# Patient Record
Sex: FEMALE | Race: WHITE | NOT HISPANIC OR LATINO | Employment: FULL TIME | URBAN - METROPOLITAN AREA
[De-identification: names, ages, dates, MRNs, and addresses within clinical notes are randomized per-mention and may not be internally consistent; named-entity substitution may affect disease eponyms.]

---

## 2023-11-30 DIAGNOSIS — I10 HYPERTENSION, UNSPECIFIED TYPE: ICD-10-CM

## 2023-11-30 RX ORDER — NIFEDIPINE 60 MG/1
60 TABLET, EXTENDED RELEASE ORAL DAILY
Qty: 90 TABLET | Refills: 3 | Status: SHIPPED
Start: 2023-11-30 | End: 2024-03-15 | Stop reason: WASHOUT

## 2023-12-14 PROBLEM — I10 HYPERTENSION: Status: ACTIVE | Noted: 2022-12-09

## 2023-12-14 PROBLEM — F41.9 ANXIETY: Status: ACTIVE | Noted: 2023-12-14

## 2023-12-14 PROBLEM — R87.619 ABNORMAL PAP SMEAR OF CERVIX: Status: ACTIVE | Noted: 2023-12-14

## 2023-12-14 PROBLEM — R74.8 ABNORMAL LIVER ENZYMES: Status: ACTIVE | Noted: 2023-12-14

## 2023-12-14 PROBLEM — R00.2 PALPITATIONS: Status: ACTIVE | Noted: 2023-12-14

## 2023-12-14 PROBLEM — R79.89 TSH ELEVATION: Status: ACTIVE | Noted: 2023-12-14

## 2023-12-14 PROBLEM — R87.619 ABNORMAL PAP SMEAR OF CERVIX: Status: RESOLVED | Noted: 2023-12-14 | Resolved: 2023-12-14

## 2024-02-19 PROCEDURE — RXMED WILLOW AMBULATORY MEDICATION CHARGE

## 2024-02-20 ENCOUNTER — PHARMACY VISIT (OUTPATIENT)
Dept: PHARMACY | Facility: CLINIC | Age: 34
End: 2024-02-20
Payer: COMMERCIAL

## 2024-03-15 ENCOUNTER — OFFICE VISIT (OUTPATIENT)
Dept: PRIMARY CARE | Facility: CLINIC | Age: 34
End: 2024-03-15
Payer: COMMERCIAL

## 2024-03-15 VITALS
TEMPERATURE: 97.7 F | WEIGHT: 135.4 LBS | SYSTOLIC BLOOD PRESSURE: 145 MMHG | BODY MASS INDEX: 23.99 KG/M2 | DIASTOLIC BLOOD PRESSURE: 98 MMHG | HEIGHT: 63 IN | HEART RATE: 93 BPM

## 2024-03-15 DIAGNOSIS — E78.5 HYPERLIPIDEMIA, UNSPECIFIED HYPERLIPIDEMIA TYPE: ICD-10-CM

## 2024-03-15 DIAGNOSIS — F41.9 ANXIETY: ICD-10-CM

## 2024-03-15 DIAGNOSIS — I10 PRIMARY HYPERTENSION: ICD-10-CM

## 2024-03-15 DIAGNOSIS — B34.9 VIRAL SYNDROME: ICD-10-CM

## 2024-03-15 DIAGNOSIS — Z00.00 WELL ADULT HEALTH CHECK: ICD-10-CM

## 2024-03-15 PROCEDURE — 3077F SYST BP >= 140 MM HG: CPT | Performed by: INTERNAL MEDICINE

## 2024-03-15 PROCEDURE — 99214 OFFICE O/P EST MOD 30 MIN: CPT | Performed by: INTERNAL MEDICINE

## 2024-03-15 PROCEDURE — 3080F DIAST BP >= 90 MM HG: CPT | Performed by: INTERNAL MEDICINE

## 2024-03-15 PROCEDURE — 1036F TOBACCO NON-USER: CPT | Performed by: INTERNAL MEDICINE

## 2024-03-15 PROCEDURE — RXMED WILLOW AMBULATORY MEDICATION CHARGE

## 2024-03-15 RX ORDER — DULOXETIN HYDROCHLORIDE 20 MG/1
20 CAPSULE, DELAYED RELEASE ORAL DAILY
COMMUNITY
End: 2024-03-15 | Stop reason: SDUPTHER

## 2024-03-15 RX ORDER — DULOXETIN HYDROCHLORIDE 20 MG/1
20 CAPSULE, DELAYED RELEASE ORAL DAILY
Qty: 90 CAPSULE | Refills: 1 | Status: SHIPPED | OUTPATIENT
Start: 2024-03-15

## 2024-03-15 ASSESSMENT — PATIENT HEALTH QUESTIONNAIRE - PHQ9
SUM OF ALL RESPONSES TO PHQ9 QUESTIONS 1 AND 2: 0
2. FEELING DOWN, DEPRESSED OR HOPELESS: NOT AT ALL
1. LITTLE INTEREST OR PLEASURE IN DOING THINGS: NOT AT ALL

## 2024-03-15 NOTE — PROGRESS NOTES
Assessment/Plan   Problem List Items Addressed This Visit       Anxiety    Relevant Medications    DULoxetine (Cymbalta) 20 mg DR capsule    Other Relevant Orders    TSH with reflex to Free T4 if abnormal    Hypertension    Relevant Orders    Comprehensive Metabolic Panel    Lipid Panel    Postpartum depression - Primary    Relevant Medications    DULoxetine (Cymbalta) 20 mg DR capsule    Other Relevant Orders    TSH with reflex to Free T4 if abnormal    Viral syndrome    Relevant Orders    CBC and Auto Differential     Other Visit Diagnoses       Well adult health check        Relevant Orders    Comprehensive Metabolic Panel    Hyperlipidemia, unspecified hyperlipidemia type        Relevant Orders    Lipid Panel        #1 viral syndrome will resolve  #2 hypertension under control  #3 anxiety and depression under control current medication she wants to continue it refill provided advised to follow-up in 3 months  #4 Labs as ordered to be done    Subjective   Patient ID: Tamera Isaac is a 33 y.o. female who presents for Follow-up (Patient declines a physical today.  ).    Past Surgical History:   Procedure Laterality Date    OTHER SURGICAL HISTORY  2022    Surgically induced       No family history on file.   Social History     Socioeconomic History    Marital status:      Spouse name: Not on file    Number of children: Not on file    Years of education: Not on file    Highest education level: Not on file   Occupational History    Not on file   Tobacco Use    Smoking status: Never    Smokeless tobacco: Never   Substance and Sexual Activity    Alcohol use: Yes     Comment: 1 per month    Drug use: Never    Sexual activity: Not on file   Other Topics Concern    Not on file   Social History Narrative    Not on file     Social Determinants of Health     Financial Resource Strain: Not on file   Food Insecurity: Not on file   Transportation Needs: Not on file   Physical Activity: Not on file  "  Stress: Not on file   Social Connections: Not on file   Intimate Partner Violence: Not on file   Housing Stability: Not on file      Patient has no known allergies.   Current Outpatient Medications   Medication Sig Dispense Refill    DOCOSAHEXAENOIC ACID ORAL Take 1 capsule by mouth once daily.      NIFEdipine ER (NIFEdipine XL) 60 mg 24 hr tablet Take 1 tablet by mouth every day as directed 330 tablet 0    DULoxetine (Cymbalta) 20 mg DR capsule Take 1 capsule (20 mg) by mouth once daily. 90 capsule 1     No current facility-administered medications for this visit.      Vitals:    03/15/24 1049   BP: (!) 145/98   BP Location: Right arm   Patient Position: Sitting   Pulse: 93   Temp: 36.5 °C (97.7 °F)   Weight: 61.4 kg (135 lb 6.4 oz)   Height: 1.6 m (5' 3\")      Problem List Items Addressed This Visit       Anxiety    Relevant Medications    DULoxetine (Cymbalta) 20 mg DR capsule    Other Relevant Orders    TSH with reflex to Free T4 if abnormal    Hypertension    Relevant Orders    Comprehensive Metabolic Panel    Lipid Panel    Postpartum depression - Primary    Relevant Medications    DULoxetine (Cymbalta) 20 mg DR capsule    Other Relevant Orders    TSH with reflex to Free T4 if abnormal    Viral syndrome    Relevant Orders    CBC and Auto Differential     Other Visit Diagnoses       Well adult health check        Relevant Orders    Comprehensive Metabolic Panel    Hyperlipidemia, unspecified hyperlipidemia type        Relevant Orders    Lipid Panel           Orders Placed This Encounter   Procedures    CBC and Auto Differential     Standing Status:   Future     Standing Expiration Date:   3/15/2025     Order Specific Question:   Release result to [employer]     Answer:   Immediate    Comprehensive Metabolic Panel     Standing Status:   Future     Standing Expiration Date:   3/15/2025     Order Specific Question:   Release result to [employer]     Answer:   Immediate    Lipid Panel     Standing Status:   Future     " "Standing Expiration Date:   3/15/2025     Order Specific Question:   Release result to [employer]     Answer:   Immediate    TSH with reflex to Free T4 if abnormal     Standing Status:   Future     Standing Expiration Date:   3/15/2025     Order Specific Question:   Release result to MyChart     Answer:   Immediate        HPI  Came for review and refill  Has been doing well  Except for last 2 3 days has a sneezing and congestion took COVID test that was negative does not want any antibiotic rightly so because it is a viral syndrome  Advised had to be seen more frequently  Her baby is doing very well 1-year-old    ROS negative  Past medical history reviewed  Social and family history reviewed  Allergies and medications reviewed  Recent labs reviewed  Vital signs reviewed    PHYSICAL EXAM  Cardiovascular chest abdominal neurological exam normal  Affect normal      No results found for: \"PR1\", \"BMPR1A\", \"CMPLAS\", \"KK3MYRUE\", \"KPSAT\"   No results found for: \"CHOL\", \"LDLCALC\", \"HDLC\", \"LCTRG\", \"CHHDL\"             "

## 2024-03-22 ENCOUNTER — OFFICE VISIT (OUTPATIENT)
Dept: NEPHROLOGY | Facility: CLINIC | Age: 34
End: 2024-03-22
Payer: COMMERCIAL

## 2024-03-22 ENCOUNTER — LAB (OUTPATIENT)
Dept: LAB | Facility: LAB | Age: 34
End: 2024-03-22
Payer: COMMERCIAL

## 2024-03-22 VITALS
SYSTOLIC BLOOD PRESSURE: 148 MMHG | BODY MASS INDEX: 23.74 KG/M2 | DIASTOLIC BLOOD PRESSURE: 95 MMHG | HEIGHT: 63 IN | WEIGHT: 134 LBS | HEART RATE: 92 BPM

## 2024-03-22 DIAGNOSIS — I10 ESSENTIAL HYPERTENSION: ICD-10-CM

## 2024-03-22 DIAGNOSIS — I10 PRIMARY HYPERTENSION: ICD-10-CM

## 2024-03-22 DIAGNOSIS — E78.5 HYPERLIPIDEMIA, UNSPECIFIED HYPERLIPIDEMIA TYPE: ICD-10-CM

## 2024-03-22 DIAGNOSIS — B34.9 VIRAL SYNDROME: ICD-10-CM

## 2024-03-22 DIAGNOSIS — F41.9 ANXIETY: ICD-10-CM

## 2024-03-22 DIAGNOSIS — I10 ESSENTIAL HYPERTENSION: Primary | ICD-10-CM

## 2024-03-22 DIAGNOSIS — Z00.00 WELL ADULT HEALTH CHECK: ICD-10-CM

## 2024-03-22 LAB
ALBUMIN SERPL BCP-MCNC: 4.5 G/DL (ref 3.4–5)
ALP SERPL-CCNC: 70 U/L (ref 33–110)
ALT SERPL W P-5'-P-CCNC: 15 U/L (ref 7–45)
ANION GAP SERPL CALC-SCNC: 12 MMOL/L (ref 10–20)
AST SERPL W P-5'-P-CCNC: 17 U/L (ref 9–39)
BASOPHILS # BLD AUTO: 0.04 X10*3/UL (ref 0–0.1)
BASOPHILS NFR BLD AUTO: 0.4 %
BILIRUB SERPL-MCNC: 0.5 MG/DL (ref 0–1.2)
BUN SERPL-MCNC: 13 MG/DL (ref 6–23)
CALCIUM SERPL-MCNC: 8.9 MG/DL (ref 8.6–10.3)
CHLORIDE SERPL-SCNC: 100 MMOL/L (ref 98–107)
CHOLEST SERPL-MCNC: 174 MG/DL (ref 0–199)
CHOLESTEROL/HDL RATIO: 1.7
CO2 SERPL-SCNC: 28 MMOL/L (ref 21–32)
CREAT SERPL-MCNC: 0.59 MG/DL (ref 0.5–1.05)
EGFRCR SERPLBLD CKD-EPI 2021: >90 ML/MIN/1.73M*2
EOSINOPHIL # BLD AUTO: 0.05 X10*3/UL (ref 0–0.7)
EOSINOPHIL NFR BLD AUTO: 0.6 %
ERYTHROCYTE [DISTWIDTH] IN BLOOD BY AUTOMATED COUNT: 12.4 % (ref 11.5–14.5)
GLUCOSE SERPL-MCNC: 82 MG/DL (ref 74–99)
HCT VFR BLD AUTO: 40.4 % (ref 36–46)
HDLC SERPL-MCNC: 102.3 MG/DL
HGB BLD-MCNC: 13.4 G/DL (ref 12–16)
IMM GRANULOCYTES # BLD AUTO: 0.03 X10*3/UL (ref 0–0.7)
IMM GRANULOCYTES NFR BLD AUTO: 0.3 % (ref 0–0.9)
LDLC SERPL CALC-MCNC: 60 MG/DL
LYMPHOCYTES # BLD AUTO: 2.17 X10*3/UL (ref 1.2–4.8)
LYMPHOCYTES NFR BLD AUTO: 24.2 %
MCH RBC QN AUTO: 31.7 PG (ref 26–34)
MCHC RBC AUTO-ENTMCNC: 33.2 G/DL (ref 32–36)
MCV RBC AUTO: 96 FL (ref 80–100)
MONOCYTES # BLD AUTO: 0.68 X10*3/UL (ref 0.1–1)
MONOCYTES NFR BLD AUTO: 7.6 %
NEUTROPHILS # BLD AUTO: 6.01 X10*3/UL (ref 1.2–7.7)
NEUTROPHILS NFR BLD AUTO: 66.9 %
NON HDL CHOLESTEROL: 72 MG/DL (ref 0–149)
NRBC BLD-RTO: 0 /100 WBCS (ref 0–0)
PLATELET # BLD AUTO: 279 X10*3/UL (ref 150–450)
POTASSIUM SERPL-SCNC: 4.4 MMOL/L (ref 3.5–5.3)
PROT SERPL-MCNC: 7.4 G/DL (ref 6.4–8.2)
RBC # BLD AUTO: 4.23 X10*6/UL (ref 4–5.2)
SODIUM SERPL-SCNC: 136 MMOL/L (ref 136–145)
TRIGL SERPL-MCNC: 60 MG/DL (ref 0–149)
TSH SERPL-ACNC: 1.94 MIU/L (ref 0.44–3.98)
VLDL: 12 MG/DL (ref 0–40)
WBC # BLD AUTO: 9 X10*3/UL (ref 4.4–11.3)

## 2024-03-22 PROCEDURE — 99213 OFFICE O/P EST LOW 20 MIN: CPT | Performed by: INTERNAL MEDICINE

## 2024-03-22 PROCEDURE — 85025 COMPLETE CBC W/AUTO DIFF WBC: CPT

## 2024-03-22 PROCEDURE — 84443 ASSAY THYROID STIM HORMONE: CPT

## 2024-03-22 PROCEDURE — 3077F SYST BP >= 140 MM HG: CPT | Performed by: INTERNAL MEDICINE

## 2024-03-22 PROCEDURE — 36415 COLL VENOUS BLD VENIPUNCTURE: CPT

## 2024-03-22 PROCEDURE — 1036F TOBACCO NON-USER: CPT | Performed by: INTERNAL MEDICINE

## 2024-03-22 PROCEDURE — 80053 COMPREHEN METABOLIC PANEL: CPT

## 2024-03-22 PROCEDURE — 3080F DIAST BP >= 90 MM HG: CPT | Performed by: INTERNAL MEDICINE

## 2024-03-22 PROCEDURE — 80061 LIPID PANEL: CPT

## 2024-03-22 NOTE — PROGRESS NOTES
Tamera Isaac   33 y.o.    @WT@  N/Room: [id number]/Room/bed info not found    Subjective:   The patient is being seen for a routine clinic follow-up of hypertension.      Meds:   Current Outpatient Medications   Medication Sig Dispense Refill    DOCOSAHEXAENOIC ACID ORAL Take 1 capsule by mouth once daily.      DULoxetine (Cymbalta) 20 mg DR capsule Take 1 capsule (20 mg) by mouth once daily. 90 capsule 1    NIFEdipine ER (NIFEdipine XL) 60 mg 24 hr tablet Take 1 tablet by mouth every day as directed 330 tablet 0     No current facility-administered medications for this visit.          ROS:  The patient is awake and oriented. No dizziness or lightheadedness. No chills and no fever. No headaches. No nausea and no vomiting. No shortness of breath. No cough. No sputum. No chest pain. No chest tightness. No abdominal pain. No diarrhea and no constipation. No hematemesis or hemoptysis. No hematuria. No rectal bleeding. No melena. No epistaxis. No urinary symptoms. No flank pain. No leg edema. No leg pain. No weakness. No itching. Overall, the rest of the review of systems is also negative.  12 point review of systems otherwise negative as stated in HPI.        Physical Examination:        Vitals:    03/22/24 1010   BP: (!) 148/95   Pulse: 92     General: The patient is awake, oriented, and is not in any distress.  Head and Neck: Normocephalic. No periorbital edema.  Eyes: non-icteric  Respiratory: Symmetric air entry. Symmetric chest expansion.No respiratory distress.  Cardiovascular: Symmetric peripheral pulses.  Skin: No maculopapular rash.  Abdomen: soft, nt/nd  Musculoskeletal: No peripheral edema in both left and right upper extremities.  No edema in either left or right lower extremities.  Neuro Exam: Speech is fluent. Moves extremities.    Imaging:  === 09/20/19 ===    US RENAL COMPLETE    - Impression -  No nephrolithiasis or hydronephrosis.       Blood Labs:  No results found for this or any previous visit  (from the past 24 hour(s)).   Lab Results   Component Value Date    PROTUR <4 (L) 09/26/2019    PROTUR NEGATIVE 09/26/2019      Lab Results   Component Value Date    GLUCOSE 93 01/03/2020    CALCIUM 9.6 01/03/2020     01/03/2020    K 3.9 01/03/2020    CO2 25 01/03/2020     01/03/2020    BUN 10 01/03/2020    CREATININE 0.65 01/03/2020         Assessment and Plan:    1. Hypertension.  Pressure is high in the office.  I reviewed her home blood pressure readings and she has perfectly good blood pressure control at home.  No change in medication.  We discussed about low-salt diet.    I will see her in about a year for follow-up.         Viktor Vu MD

## 2024-03-27 ENCOUNTER — PHARMACY VISIT (OUTPATIENT)
Dept: PHARMACY | Facility: CLINIC | Age: 34
End: 2024-03-27
Payer: COMMERCIAL

## 2024-04-16 ENCOUNTER — OFFICE VISIT (OUTPATIENT)
Dept: PRIMARY CARE | Facility: CLINIC | Age: 34
End: 2024-04-16
Payer: COMMERCIAL

## 2024-04-16 VITALS
SYSTOLIC BLOOD PRESSURE: 129 MMHG | DIASTOLIC BLOOD PRESSURE: 83 MMHG | HEIGHT: 63 IN | BODY MASS INDEX: 24.34 KG/M2 | WEIGHT: 137.4 LBS | HEART RATE: 96 BPM | TEMPERATURE: 97.7 F

## 2024-04-16 DIAGNOSIS — R22.1 LOCALIZED SWELLING, MASS AND LUMP, NECK: Primary | ICD-10-CM

## 2024-04-16 DIAGNOSIS — I10 HYPERTENSION, UNSPECIFIED TYPE: ICD-10-CM

## 2024-04-16 PROCEDURE — 1036F TOBACCO NON-USER: CPT | Performed by: INTERNAL MEDICINE

## 2024-04-16 PROCEDURE — 99214 OFFICE O/P EST MOD 30 MIN: CPT | Performed by: INTERNAL MEDICINE

## 2024-04-16 PROCEDURE — 3074F SYST BP LT 130 MM HG: CPT | Performed by: INTERNAL MEDICINE

## 2024-04-16 PROCEDURE — 3079F DIAST BP 80-89 MM HG: CPT | Performed by: INTERNAL MEDICINE

## 2024-04-16 ASSESSMENT — PATIENT HEALTH QUESTIONNAIRE - PHQ9
2. FEELING DOWN, DEPRESSED OR HOPELESS: NOT AT ALL
1. LITTLE INTEREST OR PLEASURE IN DOING THINGS: NOT AT ALL
SUM OF ALL RESPONSES TO PHQ9 QUESTIONS 1 AND 2: 0

## 2024-04-16 NOTE — PROGRESS NOTES
"Subjective   Patient ID: Tamera Isaac is a 33 y.o. female who presents for Evaluate lump behind left ear.    HPI Pt is a pleasant 33 y.o. CF who was seen and evaluated during the OV with the hx of the lump behind the left ear for the last 2 months. Pt states that this lump is painless, but getting bigger, Pt denies any hx of trauma.   During the clinical encounter pt denies fever, chills, no SOB, no chest pain/tightness, no abdominal pain, no N/V/D reported, no change of urination reported. Pt denies any other health concerns during this visit.  Sohx: reviewed  PMHx: reviewed  Fhx: father had skin and neck CA    Review of Systems  The systems have been reviewed as follows:   Constitutional: no fever, no chills  Eyes: no eyesight problems, no eye redness, no eye pain, no blurred vision  ENT: no ear pain or sore throat, no nasal discharge or congestion, no sinus pressure, no hearing loss, but as mentioned at HPI  Cardiovascular: no chest pain or tightness, no SOB, the heart rate was not fast or slow  Respiratory: no cough, no dyspnea with exertion or with rest, no wheezing  Gastrointestinal: no abdominal pain, no constipation or diarrhea, no heartburn, no nausea or vomiting  Genitourinary: no urine frequency, no dysuria, no urinary urgency, no urinary incontinence or retention  Musculoskeletal: no back pain, no arthralgia, no joint swelling or stiffness, no muscle weakness  Integumentary: no skin lesions or rash  Neurological: no headaches, no dizziness or fainting, no limb weakness  Psychiatric: no mood changes, no anxiety or depression, no SI/HI  Endocrine: no weight change, no temperature intolerance, no change of appetite  Hematologic/Lymphatic: no easy bruising or bleeding  Objective   /83 (BP Location: Left arm, Patient Position: Sitting)   Pulse 96   Temp 36.5 °C (97.7 °F)   Ht 1.6 m (5' 3\")   Wt 62.3 kg (137 lb 6.4 oz)   BMI 24.34 kg/m²     Physical Exam  Constitutional: well hydrated, well " nourished, no acute distress. Vital signs reviewed  Head and face: normocephalic, atraumatic  Eyes: no erythema, edema or visible abnormalities of conjunctiva or lids. Pupils are equal, round and reactive to light. Extraocular movement normal  ENT: external ears without deformities, external ear canals without redness, swelling or tenderness. TMs normal color, normal landmarks, no fluid, non-retracted  Neck: full ROM, no adenopathy, neck was supple, thyroid gland not enlarged, no palpable nodules. The exam of the left side of the neck showed the bean size soft rubber consistent lump, located on the left superior lateral neck surface  Pulmonary: normal respiratory rate and effort. Lungs are clear to auscultation, no rales,no rhonchi or wheezing  Cardiovascular: RRR, normal S1 and S2, no murmur, no gallop, posterior tib. pulse normal 2+ bilaterally, no lower extremity edema  Abdomen: soft, non tender on palpation, not distended, normal BS in all 4 quadrants  Musculoskeletal: no joint swelling, normal movements of all 4 extremities. Gait and station: normal, Digits and nails: normal without clubbing  Skin: normal color, no rash  Neurologic: Cranial nerves: CN II: visual acuity intact. Source: acuity reported by patient. Pupils reactive to light with normal accommodation. Right and left pupil normal CN III, IV and VI: the EO movements were intact. CN VIII: no hearing loss. Sensory exam: normal light to touch  Psychiatric: Mood and affect: normal, Alert and Oriented x 3  Lymphatic: no cervical lymphadenopathy  Assessment/Plan   Lump on the neck, left sided:  Hx and PE as mentioned  Recent BW reviewed and TSH level WNL  Will order US of the soft neck tissue  Will provide the referral for ENT team evaluation  Pt was instructed to contact PCP if pt will have no improvement of the condition/worsening of the sxs/new sxs  I discussed every sxs with the pt in detail. Pt verbalized understanding of the health  condition    HTN:  continue on nifedipine 60 mg PO daily  Please FU with PCP as planned or sooner if needed.

## 2024-04-20 ENCOUNTER — HOSPITAL ENCOUNTER (OUTPATIENT)
Dept: RADIOLOGY | Facility: CLINIC | Age: 34
Discharge: HOME | End: 2024-04-20
Payer: COMMERCIAL

## 2024-04-20 DIAGNOSIS — R22.1 LOCALIZED SWELLING, MASS AND LUMP, NECK: ICD-10-CM

## 2024-04-20 PROCEDURE — 76536 US EXAM OF HEAD AND NECK: CPT

## 2024-04-20 PROCEDURE — 76536 US EXAM OF HEAD AND NECK: CPT | Performed by: RADIOLOGY

## 2024-04-30 ENCOUNTER — OFFICE VISIT (OUTPATIENT)
Dept: OTOLARYNGOLOGY | Facility: HOSPITAL | Age: 34
End: 2024-04-30
Payer: COMMERCIAL

## 2024-04-30 ENCOUNTER — APPOINTMENT (OUTPATIENT)
Dept: OTOLARYNGOLOGY | Facility: HOSPITAL | Age: 34
End: 2024-04-30
Payer: COMMERCIAL

## 2024-04-30 VITALS — WEIGHT: 134.7 LBS | HEIGHT: 63 IN | BODY MASS INDEX: 23.87 KG/M2 | TEMPERATURE: 96.4 F

## 2024-04-30 DIAGNOSIS — R22.1 LOCALIZED SWELLING, MASS AND LUMP, NECK: ICD-10-CM

## 2024-04-30 DIAGNOSIS — L98.9 SCALP LESION: ICD-10-CM

## 2024-04-30 PROCEDURE — 1036F TOBACCO NON-USER: CPT | Performed by: STUDENT IN AN ORGANIZED HEALTH CARE EDUCATION/TRAINING PROGRAM

## 2024-04-30 PROCEDURE — 99204 OFFICE O/P NEW MOD 45 MIN: CPT | Performed by: STUDENT IN AN ORGANIZED HEALTH CARE EDUCATION/TRAINING PROGRAM

## 2024-04-30 PROCEDURE — 99214 OFFICE O/P EST MOD 30 MIN: CPT | Performed by: STUDENT IN AN ORGANIZED HEALTH CARE EDUCATION/TRAINING PROGRAM

## 2024-04-30 ASSESSMENT — PATIENT HEALTH QUESTIONNAIRE - PHQ9
1. LITTLE INTEREST OR PLEASURE IN DOING THINGS: NOT AT ALL
SUM OF ALL RESPONSES TO PHQ9 QUESTIONS 1 & 2: 0
2. FEELING DOWN, DEPRESSED OR HOPELESS: NOT AT ALL

## 2024-04-30 NOTE — PROGRESS NOTES
"Chief Complaint:    Chief Complaint   Patient presents with    Follow-up     Left side of neck mass eval.       History of Present Illness:  33 y.o. female presents to [facility] on 24 for a left postauricular lesion.  The patient noticed a \"bump\" behind her left ear several months ago.  This seems to be stable and has not changed.  She has no pain or overlying skin changes.  Of note, the patient has a father with a history of what sounds like oropharyngeal cancer and a tongue cancer that was treated at [facility].  Her father is currently doing well, but it sounds like he initially presented with a lymph node, and this placed concern for her.  The patient had an ultrasound of the area showing:    FINDINGS:  Within the left post auricular region at the site of reported lump  there is a small ovoid hypoechoic horizontally oriented lymph node  measuring 6 x 2 x 6 mm. No additional solid or cystic lesion is  demonstrated.      IMPRESSION:  Palpable lump in the left postauricular region corresponds to a  nonspecific subcentimeter lymph node.    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  She is a never smoker and drinks socially.    Past Medical History  Past Medical History:   Diagnosis Date    Abnormal Pap smear of cervix 2023       Past Surgical History  Past Surgical History:   Procedure Laterality Date    OTHER SURGICAL HISTORY  2022    Surgically induced        Social History  Social History     Socioeconomic History    Marital status:      Spouse name: Not on file    Number of children: Not on file    Years of education: Not on file    Highest education level: Not on file   Occupational History    Not on file   Tobacco Use    Smoking status: Never     Passive exposure: Never    Smokeless tobacco: Never   Vaping Use    Vaping status: Never Used   Substance and Sexual Activity    Alcohol use: Yes     Comment: 1 per month    Drug " use: Never    Sexual activity: Not on file   Other Topics Concern    Not on file   Social History Narrative    Not on file     Social Determinants of Health     Financial Resource Strain: Not on file   Food Insecurity: Not on file   Transportation Needs: Not on file   Physical Activity: Not on file   Stress: Not on file   Social Connections: Not on file   Intimate Partner Violence: Not on file   Housing Stability: Not on file       Family History  Family History   Problem Relation Name Age of Onset    Hypertension Mother      Hypertension Father      Hyperlipidemia Father      Skin cancer Father      Other (head cancer) Father      Other (neck cancer) Father      Other (mouth cancer) Father         Medications:  Current Outpatient Medications   Medication Sig Dispense Refill    DULoxetine (Cymbalta) 20 mg DR capsule Take 1 capsule (20 mg) by mouth once daily. 90 capsule 1    NIFEdipine ER (NIFEdipine XL) 60 mg 24 hr tablet Take 1 tablet by mouth every day as directed 330 tablet 0    prenatal no115/iron/folic acid (PRENATAL 19 ORAL) Take 1 tablet by mouth early in the morning..       No current facility-administered medications for this visit.       Allergies: Patient has no known allergies.    Immunizations:   Immunization History   Administered Date(s) Administered    DTP 1990, 1990, 1990    DTaP, Unspecified 09/02/1992    Flu vaccine (IIV4), preservative free *Check age/dose* 10/17/2018, 10/03/2019, 10/06/2020, 11/02/2020, 10/01/2021, 09/30/2022, 11/02/2023    HPV, Quadrivalent 03/26/2015, 05/28/2015, 09/24/2015    Hepatitis A vaccine, age 19 years and greater (HAVRIX) 06/26/2019    Hepatitis B vaccine, pediatric/adolescent (RECOMBIVAX, ENGERIX) 05/27/1994, 06/30/1994, 10/01/1994    HiB, unspecified 1990, 1990, 10/09/1991    Influenza, Unspecified 09/24/2015    Influenza, injectable, quadrivalent 10/01/2017    MMR vaccine, subcutaneous (MMR II) 10/09/1991, 05/23/2002, 07/07/2008     "Meningococcal MCV4P 07/07/2008    Moderna SARS-CoV-2 Vaccination 03/12/2021, 04/09/2021, 10/28/2021    OPV 1990, 1990, 09/02/1992    PPD Test 12/21/2010, 07/10/2015, 09/28/2015, 10/05/2015, 07/07/2017, 11/29/2021, 12/13/2021    Tdap vaccine, age 7 year and older (BOOSTRIX, ADACEL) 07/07/2008, 12/21/2010, 10/10/2022    Varicella vaccine, subcutaneous (VARIVAX) 07/07/2008        Review of Systems:  Constitutional: Negative for fever, weight loss and weight gain  HENT: Negative for ear pain, sore throat and hoarseness.  Negative for difficulty swallowing  Cardiovascular: Negative for chest pain and dyspnea on exertion (Can climb up 2 floors)  Respiratory: Is not experiencing shortness of breath  Gastrointestinal: Negative for nausea and vomiting  Neurological: Negative for headaches.   Psychiatric: The patient is not nervous/anxious   Musculoskeletal: Denies muscle pain/weakness  Heme/Lymph: Negative for lymph nodes, easy bruising    Physical Exam  Vital Signs:  Temp 35.8 °C (96.4 °F)   Ht 1.6 m (5' 3\")   Wt 61.1 kg (134 lb 11.2 oz)   BMI 23.86 kg/m²     General:  Well-developed, well-nourished. No distress  Communication and Voice:  Clear pitch and clarity  Respiratory  Respiratory effort:  Equal inspiration and expiration without stridor  Cardiovascular  Peripheral Vascular:  Warm extremities with equal pulses  Neuro: Patient oriented to person, place, and time;  Appropriate mood and affect;  Gait is intact with no imbalance; Cranial nerves II-XII are intact  Head and Face  Inspection: The patient has 2 superficial ulcers just to the left of her midline scalp.    Palpation:  Facial skeleton intact without bony stepoffs  Facial Strength:  Facial motility symmetric and full bilaterally  Eyes: PERRLA. No nystagmus with normal extraocular motion bilaterally  ENT  Pinna:  External ear intact and fully developed  External canal:  Canal is patent with intact skin  Tympanic Membrane:  Clear and " mobile  External nose:  No scar or anatomic deformity  Internal Nose:  Septum intact and midline.  No edema, polyp, or rhinorrhea.  TMJ:  No pain to palpation with full mobility  Salivary Glands:  No mass or tenderness  Lips: No lesion.  Oral cavity: No mass or lesion.  Oropharynx:  No mass or lesion. Tonsillar fossa symmetric. Base of tongue soft without mass or induration  Neck  Trachea:  Midline trachea.  Thyroid:  No mass or nodularity  Lymphatics:  No lymphadenopathy.  The patient has a less than 1 cm ovoid palpable lesion right overlying the mastoid.  There is no overlying skin changes    Impression/Plan:  33 y.o. female presents to [facility] on 04/30/24 for a left postauricular lesion.  This appears to be benign lymphadenopathy that is likely related to the scalp ulcers that are present on that side.  The patient does report to the picking at the area on her scalp.    -I reassured the patient that she should not be concerned about this area and it is most likely a benign reactive lymph node.  I do think that it may be related to her scalp ulcer and will refer her to dermatology for further workup

## 2024-05-13 PROCEDURE — RXMED WILLOW AMBULATORY MEDICATION CHARGE

## 2024-05-16 ENCOUNTER — PHARMACY VISIT (OUTPATIENT)
Dept: PHARMACY | Facility: CLINIC | Age: 34
End: 2024-05-16
Payer: COMMERCIAL

## 2024-05-24 ENCOUNTER — LAB (OUTPATIENT)
Dept: LAB | Facility: LAB | Age: 34
End: 2024-05-24
Payer: COMMERCIAL

## 2024-05-25 LAB — COTININE UR QL SCN: NEGATIVE

## 2024-06-14 ENCOUNTER — APPOINTMENT (OUTPATIENT)
Dept: OBSTETRICS AND GYNECOLOGY | Facility: CLINIC | Age: 34
End: 2024-06-14
Payer: COMMERCIAL

## 2024-06-14 VITALS
SYSTOLIC BLOOD PRESSURE: 149 MMHG | WEIGHT: 134.8 LBS | BODY MASS INDEX: 23.88 KG/M2 | DIASTOLIC BLOOD PRESSURE: 100 MMHG

## 2024-06-14 DIAGNOSIS — Z34.90 PRENATAL CARE, ANTEPARTUM (HHS-HCC): ICD-10-CM

## 2024-06-14 DIAGNOSIS — O21.9 NAUSEA AND VOMITING IN PREGNANCY PRIOR TO 22 WEEKS GESTATION (HHS-HCC): Primary | ICD-10-CM

## 2024-06-14 PROBLEM — R79.89 TSH ELEVATION: Status: RESOLVED | Noted: 2023-12-14 | Resolved: 2024-06-14

## 2024-06-14 PROBLEM — F41.9 ANXIETY: Status: RESOLVED | Noted: 2023-12-14 | Resolved: 2024-06-14

## 2024-06-14 PROBLEM — R00.2 PALPITATIONS: Status: RESOLVED | Noted: 2023-12-14 | Resolved: 2024-06-14

## 2024-06-14 PROBLEM — Z34.80 SUPERVISION OF OTHER NORMAL PREGNANCY, ANTEPARTUM (HHS-HCC): Status: ACTIVE | Noted: 2024-06-14

## 2024-06-14 PROBLEM — R74.8 ABNORMAL LIVER ENZYMES: Status: RESOLVED | Noted: 2023-12-14 | Resolved: 2024-06-14

## 2024-06-14 PROBLEM — B34.9 VIRAL SYNDROME: Status: RESOLVED | Noted: 2024-03-15 | Resolved: 2024-06-14

## 2024-06-14 PROBLEM — R22.1 LOCALIZED SWELLING, MASS AND LUMP, NECK: Status: RESOLVED | Noted: 2024-04-16 | Resolved: 2024-06-14

## 2024-06-14 PROBLEM — O10.911 PRE-EXISTING HYPERTENSION DURING PREGNANCY IN FIRST TRIMESTER (HHS-HCC): Status: ACTIVE | Noted: 2024-06-14

## 2024-06-14 PROBLEM — I10 HYPERTENSION: Status: RESOLVED | Noted: 2022-12-09 | Resolved: 2024-06-14

## 2024-06-14 PROCEDURE — RXMED WILLOW AMBULATORY MEDICATION CHARGE

## 2024-06-14 PROCEDURE — 0500F INITIAL PRENATAL CARE VISIT: CPT | Performed by: OBSTETRICS & GYNECOLOGY

## 2024-06-14 PROCEDURE — 87086 URINE CULTURE/COLONY COUNT: CPT

## 2024-06-14 PROCEDURE — 87186 SC STD MICRODIL/AGAR DIL: CPT

## 2024-06-14 RX ORDER — ONDANSETRON 4 MG/1
4 TABLET, FILM COATED ORAL EVERY 8 HOURS PRN
Qty: 20 TABLET | Refills: 2 | Status: SHIPPED | OUTPATIENT
Start: 2024-06-14 | End: 2024-08-13

## 2024-06-14 ASSESSMENT — EDINBURGH POSTNATAL DEPRESSION SCALE (EPDS)
TOTAL SCORE: 3
I HAVE BEEN SO UNHAPPY THAT I HAVE BEEN CRYING: NO, NEVER
THE THOUGHT OF HARMING MYSELF HAS OCCURRED TO ME: NEVER
I HAVE LOOKED FORWARD WITH ENJOYMENT TO THINGS: AS MUCH AS I EVER DID
I HAVE BEEN SO UNHAPPY THAT I HAVE HAD DIFFICULTY SLEEPING: NOT AT ALL
I HAVE BEEN ANXIOUS OR WORRIED FOR NO GOOD REASON: HARDLY EVER
I HAVE FELT SAD OR MISERABLE: NO, NOT AT ALL
I HAVE FELT SCARED OR PANICKY FOR NO GOOD REASON: NO, NOT AT ALL
I HAVE BLAMED MYSELF UNNECESSARILY WHEN THINGS WENT WRONG: NOT VERY OFTEN
I HAVE BEEN ABLE TO LAUGH AND SEE THE FUNNY SIDE OF THINGS: AS MUCH AS I ALWAYS COULD
THINGS HAVE BEEN GETTING ON TOP OF ME: NO, MOST OF THE TIME I HAVE COPED QUITE WELL

## 2024-06-14 NOTE — PROGRESS NOTES
NOB, sure LMP 4/15/24  Urine culture sent today.  EPDS = 3  C/O: Nausea/vomiting     Kayykelvin Holder MA II    33 y.o.  at 8.4 weeks gestational age by sure LMP.   Still breastfeeding son so not yet having regular periods.  Planned and desired pregnancy.  USN done today - CRL differs from LMP dating by 7 days.  EDC tentatively assigned based on informal USN today- .  Discussed 13 week USN and she will get this done.   PMH complicated by cHTN which is controlled on nifedpine XL 60mg daily.  She sees an nephrologist who manages her HTN.  OB history significant for prior term VAVD - was induced at 38 weeks due to cHTN - pushed for 4.5 hours and ultimately had VAVD.   Has nausea but tolerating.  Would like prescription for zofran as needed - sent. Taking OTC PNV.  BMI today Body mass index is 23.88 kg/m². Discussed optimal weight gain in pregnancy.  Discussed genetic screening options and carrier screening.  Likely wants NIPS.  Unsure if she had carrier screening last pregnancy at [facility]- will look through records.   Not candidate for early GDM screening.  Will gómez baseline preeclampsia labs next visit.  Start ASA next visit.   Discussed collaborative care model and group practice.  Questions answered.

## 2024-06-16 LAB — BACTERIA UR CULT: ABNORMAL

## 2024-06-17 ENCOUNTER — PHARMACY VISIT (OUTPATIENT)
Dept: PHARMACY | Facility: CLINIC | Age: 34
End: 2024-06-17
Payer: COMMERCIAL

## 2024-06-17 LAB — BACTERIA UR CULT: ABNORMAL

## 2024-06-18 ENCOUNTER — PHARMACY VISIT (OUTPATIENT)
Dept: PHARMACY | Facility: CLINIC | Age: 34
End: 2024-06-18
Payer: COMMERCIAL

## 2024-06-18 DIAGNOSIS — R82.79 POSITIVE URINE CULTURE: Primary | ICD-10-CM

## 2024-06-18 PROCEDURE — RXMED WILLOW AMBULATORY MEDICATION CHARGE

## 2024-06-18 RX ORDER — NITROFURANTOIN 25; 75 MG/1; MG/1
100 CAPSULE ORAL 2 TIMES DAILY
Qty: 14 CAPSULE | Refills: 0 | Status: SHIPPED | OUTPATIENT
Start: 2024-06-18 | End: 2024-06-25

## 2024-06-27 ENCOUNTER — APPOINTMENT (OUTPATIENT)
Dept: PRIMARY CARE | Facility: CLINIC | Age: 34
End: 2024-06-27
Payer: COMMERCIAL

## 2024-06-27 VITALS
SYSTOLIC BLOOD PRESSURE: 133 MMHG | TEMPERATURE: 97 F | BODY MASS INDEX: 23.92 KG/M2 | WEIGHT: 135 LBS | HEIGHT: 63 IN | DIASTOLIC BLOOD PRESSURE: 87 MMHG | HEART RATE: 92 BPM

## 2024-06-27 DIAGNOSIS — I10 PRIMARY HYPERTENSION: Primary | ICD-10-CM

## 2024-06-27 DIAGNOSIS — N39.0 URINARY TRACT INFECTION WITHOUT HEMATURIA, SITE UNSPECIFIED: ICD-10-CM

## 2024-06-27 DIAGNOSIS — F41.9 ANXIETY: ICD-10-CM

## 2024-06-27 PROCEDURE — RXMED WILLOW AMBULATORY MEDICATION CHARGE

## 2024-06-27 PROCEDURE — 3079F DIAST BP 80-89 MM HG: CPT | Performed by: FAMILY MEDICINE

## 2024-06-27 PROCEDURE — 3008F BODY MASS INDEX DOCD: CPT | Performed by: FAMILY MEDICINE

## 2024-06-27 PROCEDURE — 1036F TOBACCO NON-USER: CPT | Performed by: FAMILY MEDICINE

## 2024-06-27 PROCEDURE — 99214 OFFICE O/P EST MOD 30 MIN: CPT | Performed by: FAMILY MEDICINE

## 2024-06-27 PROCEDURE — 3075F SYST BP GE 130 - 139MM HG: CPT | Performed by: FAMILY MEDICINE

## 2024-06-27 RX ORDER — DULOXETIN HYDROCHLORIDE 20 MG/1
20 CAPSULE, DELAYED RELEASE ORAL DAILY
Qty: 90 CAPSULE | Refills: 3 | Status: SHIPPED | OUTPATIENT
Start: 2024-06-27

## 2024-06-27 ASSESSMENT — PATIENT HEALTH QUESTIONNAIRE - PHQ9
1. LITTLE INTEREST OR PLEASURE IN DOING THINGS: NOT AT ALL
SUM OF ALL RESPONSES TO PHQ9 QUESTIONS 1 AND 2: 0
2. FEELING DOWN, DEPRESSED OR HOPELESS: NOT AT ALL

## 2024-06-27 NOTE — ASSESSMENT & PLAN NOTE
Patient to complete Macrobid.  Have asked her to check with her OB regarding if she needs to repeat urinalysis.

## 2024-06-27 NOTE — PROGRESS NOTES
Subjective   Patient ID: Tamera Isaac is a 34 y.o. female who presents for Follow-up.  Patient reports that overall she has been doing well.  She is currently 9 weeks pregnant with her second pregnancy.  She states that her blood pressures have been good.  She has been monitoring them at home.  She also states that her mood has been good on her current dose of Cymbalta.  She has had some nausea and was given Zofran by her gynecologist.  She also had a UTI and is wondering if she should have this rechecked.  She is currently on Macrobid for this.  She states that she has been eating healthy and exercising.  She denies any chest pain or shortness of breath or abdominal pain.  She denies any other concerns.  HPI  Social History     Socioeconomic History    Marital status:      Spouse name: Torrey Ziegler    Number of children: Not on file    Years of education: Not on file    Highest education level: Not on file   Occupational History    Not on file   Tobacco Use    Smoking status: Never     Passive exposure: Never    Smokeless tobacco: Never   Vaping Use    Vaping status: Never Used   Substance and Sexual Activity    Alcohol use: Not Currently     Comment: 1 per month    Drug use: Never    Sexual activity: Yes     Partners: Male     Birth control/protection: None   Other Topics Concern    Not on file   Social History Narrative    Not on file     Social Determinants of Health     Financial Resource Strain: Not on file   Food Insecurity: Not on file   Transportation Needs: Not on file   Physical Activity: Not on file   Stress: Not on file   Social Connections: Not on file   Intimate Partner Violence: Not on file     Current Outpatient Medications   Medication Sig Dispense Refill    NIFEdipine ER (NIFEdipine XL) 60 mg 24 hr tablet Take 1 tablet by mouth every day as directed 330 tablet 0    ondansetron (Zofran) 4 mg tablet Take 1 tablet (4 mg) by mouth every 8 hours if needed for nausea or vomiting. 20 tablet  2    prenatal no115/iron/folic acid (PRENATAL 19 ORAL) Take 1 tablet by mouth early in the morning..      DULoxetine (Cymbalta) 20 mg DR capsule Take 1 capsule (20 mg) by mouth once daily. 90 capsule 3     No current facility-administered medications for this visit.     Family History   Problem Relation Name Age of Onset    Hypertension Mother Nathalia Isaac     Hypertension Father Cresencio Isaac     Hyperlipidemia Father Cresencio Isaac     Skin cancer Father Cresencio Isaac     Other (head cancer) Father Cresencio Isaac     Other (neck cancer) Father Cresencio Isaac     Other (mouth cancer) Father Cresencio Isaac     Cancer Father Cresencio Isaac      Review of Systems  Immunization History   Administered Date(s) Administered    DTP 1990, 1990, 1990    DTaP, Unspecified 09/02/1992    Flu vaccine (IIV4), preservative free *Check age/dose* 10/17/2018, 10/03/2019, 10/06/2020, 11/02/2020, 10/01/2021, 09/30/2022, 11/02/2023    HPV, Quadrivalent 03/26/2015, 05/28/2015, 09/24/2015    Hepatitis A vaccine, age 19 years and greater (HAVRIX) 06/26/2019    Hepatitis B vaccine, 19 yrs and under (RECOMBIVAX, ENGERIX) 05/27/1994, 06/30/1994, 10/01/1994    HiB, unspecified 1990, 1990, 10/09/1991    Influenza, Unspecified 09/24/2015    Influenza, injectable, quadrivalent 10/01/2017    MMR vaccine, subcutaneous (MMR II) 10/09/1991, 05/23/2002, 07/07/2008    Meningococcal ACWY-D (Menactra) 4-valent conjugate vaccine 07/07/2008    Moderna SARS-CoV-2 Vaccination 03/12/2021, 04/09/2021, 10/28/2021    OPV 1990, 1990, 09/02/1992    PPD Test 12/21/2010, 07/10/2015, 09/28/2015, 10/05/2015, 07/07/2017, 11/29/2021, 12/13/2021    Tdap vaccine, age 7 year and older (BOOSTRIX, ADACEL) 07/07/2008, 12/21/2010, 10/10/2022    Varicella vaccine, subcutaneous (VARIVAX) 07/07/2008       Review of Systems negative except as noted in HPI and Chief complaint.     Objective                 /87 (BP Location: Right arm, Patient Position:  "Sitting)   Pulse 92   Temp 36.1 °C (97 °F)   Ht 1.6 m (5' 3\")   Wt 61.2 kg (135 lb)   LMP 04/15/2024 (Exact Date)   BMI 23.91 kg/m²    Physical Exam  Vitals reviewed.   HENT:      Head: Normocephalic and atraumatic.      Right Ear: Tympanic membrane normal.      Left Ear: Tympanic membrane normal.      Nose: Nose normal.   Eyes:      Extraocular Movements: Extraocular movements intact.      Conjunctiva/sclera: Conjunctivae normal.      Pupils: Pupils are equal, round, and reactive to light.   Cardiovascular:      Rate and Rhythm: Normal rate and regular rhythm.      Pulses: Normal pulses.   Pulmonary:      Effort: Pulmonary effort is normal.      Breath sounds: Normal breath sounds.   Abdominal:      General: There is no distension.      Palpations: Abdomen is soft.      Tenderness: There is no abdominal tenderness.   Musculoskeletal:         General: Normal range of motion.      Cervical back: Normal range of motion and neck supple.      Right lower leg: No edema.      Left lower leg: No edema.   Lymphadenopathy:      Cervical: No cervical adenopathy.   Neurological:      General: No focal deficit present.      Mental Status: She is alert.       No results found for this or any previous visit (from the past 96 hour(s)).    Assessment/Plan   Problem List Items Addressed This Visit       Anxiety     Symptoms well-controlled.  Continue with current dose of Cymbalta.  This was refilled for 1 year.  Follow-up sooner with any concerns.         Relevant Medications    DULoxetine (Cymbalta) 20 mg DR capsule    Postpartum depression     Symptoms well-controlled.  Continue with current dose of Cymbalta.  This was refilled for 1 year.  Follow-up sooner with any concerns.         Relevant Medications    DULoxetine (Cymbalta) 20 mg DR capsule    Primary hypertension - Primary     Blood pressure well-controlled.  Continue with current dose of nifedipine.         Body mass index (BMI) of 23.0 to 23.9 in adult    Urinary " tract infection without hematuria     Patient to complete Macrobid.  Have asked her to check with her OB regarding if she needs to repeat urinalysis.

## 2024-06-27 NOTE — ASSESSMENT & PLAN NOTE
Symptoms well-controlled.  Continue with current dose of Cymbalta.  This was refilled for 1 year.  Follow-up sooner with any concerns.

## 2024-07-15 ENCOUNTER — PHARMACY VISIT (OUTPATIENT)
Dept: PHARMACY | Facility: CLINIC | Age: 34
End: 2024-07-15
Payer: COMMERCIAL

## 2024-07-18 NOTE — PROGRESS NOTES
Patient presents for OBFU & physical   OB labs with NIPT & GC/CT  Pap: 1/8/2021 nml  C/O: None, some nausea    Kayy Holder MA II      Routine prenatal visit   Feeling a little better.  OB physical done today.  Routine labs/baseline preeclampsia labs, carrier screening, NIPS done today.  Discussed starting ASA prophylaxis and she will start - declines prescription.  Has 13 week USN scheduled.     Medical Problems       Problem List       Anxiety    Supervision of other normal pregnancy, antepartum ([facility])    Overview Addendum 7/19/2024  4:14 PM by Carla Palma MD     <> Dating   <> 13 week USN  <> NIPS  <> carrier screening         Pre-existing hypertension during pregnancy in first trimester ([facility])    Overview Signed 6/14/2024  5:03 PM by Carla Palma MD     - on nifedipine XL 60mg daily   <> ASA prophylaxis   <> Baseline preeclampsia labs              Follow up in 4 week(s).

## 2024-07-19 ENCOUNTER — APPOINTMENT (OUTPATIENT)
Dept: OBSTETRICS AND GYNECOLOGY | Facility: CLINIC | Age: 34
End: 2024-07-19
Payer: COMMERCIAL

## 2024-07-19 VITALS — DIASTOLIC BLOOD PRESSURE: 86 MMHG | SYSTOLIC BLOOD PRESSURE: 138 MMHG | BODY MASS INDEX: 24.3 KG/M2 | WEIGHT: 137.2 LBS

## 2024-07-19 DIAGNOSIS — Z3A.12 12 WEEKS GESTATION OF PREGNANCY (HHS-HCC): ICD-10-CM

## 2024-07-19 DIAGNOSIS — Z34.90 PRENATAL CARE, ANTEPARTUM (HHS-HCC): Primary | ICD-10-CM

## 2024-07-19 DIAGNOSIS — F41.9 ANXIETY: ICD-10-CM

## 2024-07-19 DIAGNOSIS — Z34.80 SUPERVISION OF OTHER NORMAL PREGNANCY, ANTEPARTUM (HHS-HCC): ICD-10-CM

## 2024-07-19 DIAGNOSIS — O10.911 PRE-EXISTING HYPERTENSION DURING PREGNANCY IN FIRST TRIMESTER, UNSPECIFIED PRE-EXISTING HYPERTENSION TYPE (HHS-HCC): ICD-10-CM

## 2024-07-19 PROBLEM — I10 PRIMARY HYPERTENSION: Status: RESOLVED | Noted: 2024-06-27 | Resolved: 2024-07-19

## 2024-07-19 PROBLEM — N39.0 URINARY TRACT INFECTION WITHOUT HEMATURIA: Status: RESOLVED | Noted: 2024-06-27 | Resolved: 2024-07-19

## 2024-07-19 LAB
ABO GROUP (TYPE) IN BLOOD: NORMAL
ANTIBODY SCREEN: NORMAL
CREAT UR-MCNC: 176.9 MG/DL (ref 20–320)
ERYTHROCYTE [DISTWIDTH] IN BLOOD BY AUTOMATED COUNT: 12.7 % (ref 11.5–14.5)
HCT VFR BLD AUTO: 36.8 % (ref 36–46)
HGB BLD-MCNC: 12.1 G/DL (ref 12–16)
MCH RBC QN AUTO: 32.9 PG (ref 26–34)
MCHC RBC AUTO-ENTMCNC: 32.9 G/DL (ref 32–36)
MCV RBC AUTO: 100 FL (ref 80–100)
NRBC BLD-RTO: 0 /100 WBCS (ref 0–0)
PLATELET # BLD AUTO: 237 X10*3/UL (ref 150–450)
PROT UR-ACNC: 16 MG/DL (ref 5–24)
PROT/CREAT UR: 0.09 MG/MG CREAT (ref 0–0.17)
RBC # BLD AUTO: 3.68 X10*6/UL (ref 4–5.2)
RH FACTOR (ANTIGEN D): NORMAL
WBC # BLD AUTO: 8 X10*3/UL (ref 4.4–11.3)

## 2024-07-19 PROCEDURE — 81220 CFTR GENE COM VARIANTS: CPT

## 2024-07-19 PROCEDURE — 86803 HEPATITIS C AB TEST: CPT

## 2024-07-19 PROCEDURE — 81329 SMN1 GENE DOS/DELETION ALYS: CPT

## 2024-07-19 PROCEDURE — 82570 ASSAY OF URINE CREATININE: CPT

## 2024-07-19 PROCEDURE — 84156 ASSAY OF PROTEIN URINE: CPT

## 2024-07-19 PROCEDURE — 86900 BLOOD TYPING SEROLOGIC ABO: CPT

## 2024-07-19 PROCEDURE — 86317 IMMUNOASSAY INFECTIOUS AGENT: CPT

## 2024-07-19 PROCEDURE — 86901 BLOOD TYPING SEROLOGIC RH(D): CPT

## 2024-07-19 PROCEDURE — 87491 CHLMYD TRACH DNA AMP PROBE: CPT

## 2024-07-19 PROCEDURE — 36415 COLL VENOUS BLD VENIPUNCTURE: CPT

## 2024-07-19 PROCEDURE — 85027 COMPLETE CBC AUTOMATED: CPT

## 2024-07-19 PROCEDURE — 87591 N.GONORRHOEAE DNA AMP PROB: CPT

## 2024-07-19 PROCEDURE — 86850 RBC ANTIBODY SCREEN: CPT

## 2024-07-19 PROCEDURE — 0501F PRENATAL FLOW SHEET: CPT | Performed by: OBSTETRICS & GYNECOLOGY

## 2024-07-19 PROCEDURE — 87389 HIV-1 AG W/HIV-1&-2 AB AG IA: CPT

## 2024-07-19 PROCEDURE — 86780 TREPONEMA PALLIDUM: CPT

## 2024-07-19 PROCEDURE — 87340 HEPATITIS B SURFACE AG IA: CPT

## 2024-07-20 LAB
C TRACH RRNA SPEC QL NAA+PROBE: NEGATIVE
HBV SURFACE AG SERPL QL IA: NONREACTIVE
HCV AB SER QL: NONREACTIVE
HIV 1+2 AB+HIV1 P24 AG SERPL QL IA: NONREACTIVE
N GONORRHOEA DNA SPEC QL PROBE+SIG AMP: NEGATIVE
REFLEX ADDED, ANEMIA PANEL: NORMAL
RUBV IGG SERPL IA-ACNC: 3.3 IA
RUBV IGG SERPL QL IA: POSITIVE
TREPONEMA PALLIDUM IGG+IGM AB [PRESENCE] IN SERUM OR PLASMA BY IMMUNOASSAY: NONREACTIVE

## 2024-07-26 ENCOUNTER — HOSPITAL ENCOUNTER (OUTPATIENT)
Dept: RADIOLOGY | Facility: CLINIC | Age: 34
Discharge: HOME | End: 2024-07-26
Payer: COMMERCIAL

## 2024-07-26 DIAGNOSIS — O16.1 HYPERTENSION AFFECTING PREGNANCY IN FIRST TRIMESTER (HHS-HCC): ICD-10-CM

## 2024-07-26 DIAGNOSIS — Z34.90 PRENATAL CARE, ANTEPARTUM (HHS-HCC): ICD-10-CM

## 2024-07-26 DIAGNOSIS — Z34.90 ENCOUNTER FOR SUPERVISION OF NORMAL PREGNANCY, UNSPECIFIED, UNSPECIFIED TRIMESTER (HHS-HCC): ICD-10-CM

## 2024-07-26 LAB
CFTR MUT ANL BLD/T: NORMAL
ELECTRONICALLY SIGNED BY: NORMAL
ELECTRONICALLY SIGNED BY: NORMAL
SMA RESULT: NORMAL

## 2024-07-26 PROCEDURE — 76801 OB US < 14 WKS SINGLE FETUS: CPT

## 2024-08-07 LAB
ELECTRONICALLY SIGNED BY: NORMAL
FRAGILE X INTERPRETATION: NORMAL
FRAGILE X RESULT: NORMAL

## 2024-08-09 ENCOUNTER — APPOINTMENT (OUTPATIENT)
Dept: RADIOLOGY | Facility: CLINIC | Age: 34
End: 2024-08-09
Payer: COMMERCIAL

## 2024-08-12 PROCEDURE — RXMED WILLOW AMBULATORY MEDICATION CHARGE

## 2024-08-16 PROCEDURE — RXMED WILLOW AMBULATORY MEDICATION CHARGE

## 2024-08-19 ENCOUNTER — PHARMACY VISIT (OUTPATIENT)
Dept: PHARMACY | Facility: CLINIC | Age: 34
End: 2024-08-19
Payer: COMMERCIAL

## 2024-08-23 ENCOUNTER — APPOINTMENT (OUTPATIENT)
Dept: OBSTETRICS AND GYNECOLOGY | Facility: CLINIC | Age: 34
End: 2024-08-23
Payer: COMMERCIAL

## 2024-08-23 VITALS — BODY MASS INDEX: 24.89 KG/M2 | WEIGHT: 140.5 LBS | DIASTOLIC BLOOD PRESSURE: 85 MMHG | SYSTOLIC BLOOD PRESSURE: 131 MMHG

## 2024-08-23 DIAGNOSIS — Z3A.17 17 WEEKS GESTATION OF PREGNANCY (HHS-HCC): Primary | ICD-10-CM

## 2024-08-23 DIAGNOSIS — O10.911 PRE-EXISTING HYPERTENSION DURING PREGNANCY IN FIRST TRIMESTER, UNSPECIFIED PRE-EXISTING HYPERTENSION TYPE (HHS-HCC): ICD-10-CM

## 2024-08-23 PROCEDURE — 0501F PRENATAL FLOW SHEET: CPT | Performed by: OBSTETRICS & GYNECOLOGY

## 2024-08-23 NOTE — PROGRESS NOTES
"Routine prenatal visit   Feeling well overall.  Appetite has improved.  Reviewed RR NIPS.  Negative carrier screening. Has anatomy USN scheduled.     Medical Problems       Problem List       Anxiety    17 weeks gestation of pregnancy ([facility])    Overview Addendum 8/23/2024  9:27 AM by Carla Palma MD     Desired provider in labor: [x] CNM  [] Physician  [x] Blood Products: [x] Yes, accepts [] No, needs counseling  [x] Initial BMI: 23.92   [x] Prenatal Labs:  UTD  [x] Cervical Cancer Screening up to date-  normal pap 2021 - plan postpartum pap   [x] Rh status: positive   [x] Genetic Screening:  RR NIPS - female - \"Mcwilliams\"  [x] NT US: (11-13 wks) WNL  [x] Baby ASA (if indicated): taking   [x] Pregnancy dated by: 13 week USN     [] Anatomy US: (19-20 wks)  [] Federal Sterilization consent signed (if indicated):  [] 1hr GCT at 24-28wks:  [] Rhogam (if indicated):   [] Fetal Surveillance (if indicated):  [] Tdap (27-32 wks, may be given up to 36 wks if initial window missed):   [] RSV (32-36 wks) (Sept. to end of Jan):   [] Flu Vaccine:    [] Breastfeeding:  [] Postpartum Birth control method:   [] GBS at 36 - 37 wks:  [] 39 weeks discussion of IOL vs. Expectant management:  [] Mode of delivery ( anticipated ):          Pre-existing hypertension during pregnancy in first trimester ([facility])    Overview Addendum 8/23/2024  9:28 AM by Carla Palma MD     - on nifedipine XL 60mg daily   - on ASA prophylaxis   - normal Baseline preeclampsia labs   <> Growth USN 28 weeks  <> NSTs 32 weeks  <> Delivery 38.0-39.6              Follow up in 4 week(s).    "

## 2024-09-13 ENCOUNTER — HOSPITAL ENCOUNTER (OUTPATIENT)
Dept: RADIOLOGY | Facility: CLINIC | Age: 34
Discharge: HOME | End: 2024-09-13
Payer: COMMERCIAL

## 2024-09-13 DIAGNOSIS — Z34.90 PRENATAL CARE, ANTEPARTUM (HHS-HCC): ICD-10-CM

## 2024-09-13 PROCEDURE — 76811 OB US DETAILED SNGL FETUS: CPT

## 2024-09-19 NOTE — PROGRESS NOTES
"Patient presents for OBFU  Glucose & 28 week folder given for n/v. Patient understands that she is to NOT drink the glucose if she is rescheduled to a different location.  Flu: Will get through employee health at [facility]  C/O: None     Kayy Holder MA II    Routine prenatal visit   Has been having loose stools the past few weeks but no other complaints.  Normal anatomy USN.  Plans to get flu vaccine at work.  Discussed recommendation for updated COVID vaccine.  Given glucola supplies for next visit.  BP mildly elevated today but pt just took BP med prior to her visit.  Reports normal Bps at home.  Continue current dose.     Medical Problems       Problem List       Anxiety    21 weeks gestation of pregnancy ([facility])    Overview Addendum 9/20/2024  9:21 AM by Carla Palma MD     Desired provider in labor: [x] CNM  [] Physician  [x] Blood Products: [x] Yes, accepts [] No, needs counseling  [x] Initial BMI: 23.92   [x] Prenatal Labs:  UTD  [x] Cervical Cancer Screening up to date-  normal pap 2021 - plan postpartum pap   [x] Rh status: positive   [x] Genetic Screening:  RR NIPS - female - \"Mcwilliams\"  [x] NT US: (11-13 wks) WNL  [x] Baby ASA (if indicated): taking   [x] Pregnancy dated by: 13 week USN     [x] Anatomy US: (19-20 wks): WNL   [] Federal Sterilization consent signed (if indicated):  [] 1hr GCT at 24-28wks:  [] Fetal Surveillance (if indicated):<> NSTs at 32 weeks   [] Tdap (27-32 wks, may be given up to 36 wks if initial window missed):   [] RSV (32-36 wks) (Sept. to end of Jan):   [] Flu Vaccine: <> plans to get at work   [x] Updated COVID vaccine recommended     [] Breastfeeding:  [] Postpartum Birth control method:   [] GBS at 36 - 37 wks:  [] 39 weeks discussion of IOL vs. Expectant management:  [] Mode of delivery ( anticipated ):          Pre-existing hypertension during pregnancy in first trimester ([facility])    Overview Addendum 8/23/2024  9:28 AM by Carla Palma MD     - on nifedipine " XL 60mg daily   - on ASA prophylaxis   - normal Baseline preeclampsia labs   <> Growth USN 28 weeks  <> NSTs 32 weeks  <> Delivery 38.0-39.6              Follow up in 4 week(s).

## 2024-09-20 ENCOUNTER — APPOINTMENT (OUTPATIENT)
Dept: OBSTETRICS AND GYNECOLOGY | Facility: CLINIC | Age: 34
End: 2024-09-20
Payer: COMMERCIAL

## 2024-09-20 VITALS — BODY MASS INDEX: 25.9 KG/M2 | DIASTOLIC BLOOD PRESSURE: 89 MMHG | SYSTOLIC BLOOD PRESSURE: 144 MMHG | WEIGHT: 146.2 LBS

## 2024-09-20 DIAGNOSIS — O10.911 PRE-EXISTING HYPERTENSION DURING PREGNANCY IN FIRST TRIMESTER, UNSPECIFIED PRE-EXISTING HYPERTENSION TYPE (HHS-HCC): ICD-10-CM

## 2024-09-20 DIAGNOSIS — Z3A.21 21 WEEKS GESTATION OF PREGNANCY (HHS-HCC): Primary | ICD-10-CM

## 2024-09-20 PROCEDURE — 0501F PRENATAL FLOW SHEET: CPT | Performed by: OBSTETRICS & GYNECOLOGY

## 2024-09-25 PROCEDURE — RXMED WILLOW AMBULATORY MEDICATION CHARGE

## 2024-10-02 ENCOUNTER — PHARMACY VISIT (OUTPATIENT)
Dept: PHARMACY | Facility: CLINIC | Age: 34
End: 2024-10-02
Payer: COMMERCIAL

## 2024-10-18 ENCOUNTER — APPOINTMENT (OUTPATIENT)
Dept: OBSTETRICS AND GYNECOLOGY | Facility: CLINIC | Age: 34
End: 2024-10-18
Payer: COMMERCIAL

## 2024-10-18 ENCOUNTER — PHARMACY VISIT (OUTPATIENT)
Dept: PHARMACY | Facility: CLINIC | Age: 34
End: 2024-10-18
Payer: COMMERCIAL

## 2024-10-18 VITALS — SYSTOLIC BLOOD PRESSURE: 129 MMHG | DIASTOLIC BLOOD PRESSURE: 87 MMHG | WEIGHT: 148.6 LBS | BODY MASS INDEX: 26.32 KG/M2

## 2024-10-18 DIAGNOSIS — O10.911 PRE-EXISTING HYPERTENSION DURING PREGNANCY IN FIRST TRIMESTER, UNSPECIFIED PRE-EXISTING HYPERTENSION TYPE (HHS-HCC): ICD-10-CM

## 2024-10-18 DIAGNOSIS — Z3A.25 25 WEEKS GESTATION OF PREGNANCY (HHS-HCC): Primary | ICD-10-CM

## 2024-10-18 DIAGNOSIS — K21.9 GASTROESOPHAGEAL REFLUX DISEASE WITHOUT ESOPHAGITIS: ICD-10-CM

## 2024-10-18 DIAGNOSIS — Z13.1 SCREENING FOR DIABETES MELLITUS (DM): ICD-10-CM

## 2024-10-18 LAB
ERYTHROCYTE [DISTWIDTH] IN BLOOD BY AUTOMATED COUNT: 13.1 % (ref 11.5–14.5)
GLUCOSE 1H P 50 G GLC PO SERPL-MCNC: 125 MG/DL
HCT VFR BLD AUTO: 30.9 % (ref 36–46)
HGB BLD-MCNC: 10 G/DL (ref 12–16)
MCH RBC QN AUTO: 32.1 PG (ref 26–34)
MCHC RBC AUTO-ENTMCNC: 32.4 G/DL (ref 32–36)
MCV RBC AUTO: 99 FL (ref 80–100)
NRBC BLD-RTO: 0 /100 WBCS (ref 0–0)
PLATELET # BLD AUTO: 173 X10*3/UL (ref 150–450)
RBC # BLD AUTO: 3.12 X10*6/UL (ref 4–5.2)
WBC # BLD AUTO: 6.7 X10*3/UL (ref 4.4–11.3)

## 2024-10-18 PROCEDURE — 86780 TREPONEMA PALLIDUM: CPT

## 2024-10-18 PROCEDURE — 82746 ASSAY OF FOLIC ACID SERUM: CPT

## 2024-10-18 PROCEDURE — 82607 VITAMIN B-12: CPT

## 2024-10-18 PROCEDURE — 82947 ASSAY GLUCOSE BLOOD QUANT: CPT

## 2024-10-18 PROCEDURE — 82728 ASSAY OF FERRITIN: CPT

## 2024-10-18 PROCEDURE — 85027 COMPLETE CBC AUTOMATED: CPT

## 2024-10-18 PROCEDURE — 83550 IRON BINDING TEST: CPT

## 2024-10-18 PROCEDURE — RXMED WILLOW AMBULATORY MEDICATION CHARGE

## 2024-10-18 RX ORDER — FAMOTIDINE 20 MG/1
20 TABLET, FILM COATED ORAL NIGHTLY
Qty: 30 TABLET | Refills: 5 | Status: SHIPPED | OUTPATIENT
Start: 2024-10-18 | End: 2025-10-18

## 2024-10-18 NOTE — PROGRESS NOTES
"Patient presents for OBFU  GTT/CBC/Syph today  C/O: Acid reflux, TUMS do not relieve her.    Kayy Holder MA II    Routine prenatal visit     Subjective    HPI:  Pt feeling well overall.  Has some reflux symptoms which are refractory to TUMS.  Mostly bothers her at night and makes it difficult to sleep. Discussed pepcid - prescription sent. Bps remain well controlled - has had some low Bps recently but sporadically.  Reviewed call parameters for highs and persistent lows where I would consider decreasing her dose. Glucola done today. Rh positive.  Tdap next visit. Has 28 week growth USN scheduled.     Objective    Vital Signs  /87   Wt 67.4 kg (148 lb 9.6 oz)   LMP 04/15/2024 (Exact Date)   BMI 26.32 kg/m²     Tamera Isaac is a 34 y.o. yo  at 25w5d here for the following concerns which we addressed today:     Medical Problems       Problem List       Anxiety    25 weeks gestation of pregnancy ([facility])    Overview Addendum 10/18/2024  3:12 PM by Carla Palma MD     Desired provider in labor: [x] CNM  [] Physician  [x] Blood Products: [x] Yes, accepts [] No, needs counseling  [x] Initial BMI: 23.92   [x] Prenatal Labs:  UTD  [x] Cervical Cancer Screening up to date-  normal pap  - plan postpartum pap   [x] Rh status: positive   [x] Genetic Screening:  RR NIPS - female - \"Mcwilliams\"  [x] NT US: (11-13 wks) WNL  [x] Baby ASA (if indicated): taking   [x] Pregnancy dated by: 13 week USN     [x] Anatomy US: (19-20 wks): WNL   [] Federal Sterilization consent signed (if indicated):  [] 1hr GCT at 24-28wks: done today   [] Fetal Surveillance (if indicated):<> NSTs at 32 weeks   [] Tdap (27-32 wks, may be given up to 36 wks if initial window missed): next visit   [] RSV (32-36 wks) (Sept. to end of ):   [] Flu Vaccine: <> plans to get at work   [x] Updated COVID vaccine recommended     [x] Breastfeeding: pump form submitted 10/18    [] Postpartum Birth control method:   [] GBS at 36 - 37 " wks:  [] 39 weeks discussion of IOL vs. Expectant management:  [] Mode of delivery ( anticipated ):          Pre-existing hypertension during pregnancy in first trimester ([facility])    Overview Addendum 10/18/2024  3:10 PM by Carla Palma MD     - on nifedipine XL 60mg daily   - on ASA prophylaxis   - normal Baseline preeclampsia labs   <> Growth USN 28 weeks- scheduled 11/8   <> NSTs 32 weeks  <> Delivery 38.0-39.6              Follow up in 3 week(s).

## 2024-10-19 LAB
FERRITIN SERPL-MCNC: 82 NG/ML
FOLATE SERPL-MCNC: 13.9 NG/ML
IRON SATN MFR SERPL: 32 %
IRON SERPL-MCNC: 142 UG/DL
REFLEX ADDED, ANEMIA PANEL: NORMAL
TIBC SERPL-MCNC: 447 UG/DL
TREPONEMA PALLIDUM IGG+IGM AB [PRESENCE] IN SERUM OR PLASMA BY IMMUNOASSAY: NONREACTIVE
UIBC SERPL-MCNC: 305 UG/DL
VIT B12 SERPL-MCNC: 225 PG/ML

## 2024-10-30 PROCEDURE — RXMED WILLOW AMBULATORY MEDICATION CHARGE

## 2024-11-01 ENCOUNTER — PHARMACY VISIT (OUTPATIENT)
Dept: PHARMACY | Facility: CLINIC | Age: 34
End: 2024-11-01
Payer: COMMERCIAL

## 2024-11-08 ENCOUNTER — TELEPHONE (OUTPATIENT)
Dept: OBSTETRICS AND GYNECOLOGY | Facility: CLINIC | Age: 34
End: 2024-11-08

## 2024-11-08 ENCOUNTER — HOSPITAL ENCOUNTER (OUTPATIENT)
Dept: RADIOLOGY | Facility: CLINIC | Age: 34
Discharge: HOME | End: 2024-11-08
Payer: COMMERCIAL

## 2024-11-08 ENCOUNTER — APPOINTMENT (OUTPATIENT)
Dept: OBSTETRICS AND GYNECOLOGY | Facility: CLINIC | Age: 34
End: 2024-11-08
Payer: COMMERCIAL

## 2024-11-08 VITALS — DIASTOLIC BLOOD PRESSURE: 76 MMHG | WEIGHT: 156 LBS | SYSTOLIC BLOOD PRESSURE: 124 MMHG | BODY MASS INDEX: 27.63 KG/M2

## 2024-11-08 DIAGNOSIS — Z23 NEED FOR TDAP VACCINATION: ICD-10-CM

## 2024-11-08 DIAGNOSIS — Z34.90 PRENATAL CARE, ANTEPARTUM (HHS-HCC): ICD-10-CM

## 2024-11-08 DIAGNOSIS — O09.43 HIGH RISK MULTIGRAVIDA IN THIRD TRIMESTER (HHS-HCC): ICD-10-CM

## 2024-11-08 DIAGNOSIS — Z3A.28 28 WEEKS GESTATION OF PREGNANCY (HHS-HCC): Primary | ICD-10-CM

## 2024-11-08 PROBLEM — O99.013 ANEMIA AFFECTING PREGNANCY IN THIRD TRIMESTER (HHS-HCC): Status: ACTIVE | Noted: 2024-11-08

## 2024-11-08 PROCEDURE — 0501F PRENATAL FLOW SHEET: CPT | Performed by: ADVANCED PRACTICE MIDWIFE

## 2024-11-08 PROCEDURE — 76816 OB US FOLLOW-UP PER FETUS: CPT

## 2024-11-08 PROCEDURE — 90471 IMMUNIZATION ADMIN: CPT | Performed by: ADVANCED PRACTICE MIDWIFE

## 2024-11-08 PROCEDURE — 90715 TDAP VACCINE 7 YRS/> IM: CPT | Performed by: ADVANCED PRACTICE MIDWIFE

## 2024-11-08 PROCEDURE — 76819 FETAL BIOPHYS PROFIL W/O NST: CPT

## 2024-11-08 NOTE — PROGRESS NOTES
"Subjective   Tamera Isaac is a 34 y.o.  at 28w5d with a working estimated date of delivery of 2025, by Ultrasound who presents for a routine prenatal visit.     She denies vaginal bleeding, leakage of fluid, decreased fetal movements, or contractions.    BP log sent in, normotensive.   Has growth ultrasound today.   Amenable to TDAP today.     Objective   Physical Exam:   Weight: 70.8 kg (156 lb)  TW.526 kg (21 lb)  BP: 124/76  Fetal Heart Rate: 142 Fundal Height (cm): 29 cm           Postpartum Depression: Low Risk  (2024)    [place]  Depression Scale     Last EPDS Total Score: 3     Last EPDS Self Harm Result: Never        Prenatal Labs  Lab Results   Component Value Date    HGB 10.0 (L) 10/18/2024    HCT 30.9 (L) 10/18/2024     10/18/2024    SYPHT Nonreactive 10/18/2024     Lab Results   Component Value Date    GLUC1P 125 10/18/2024     No results found for: \"GRPBSTREP\"         Assessment/Plan   Problem List Items Addressed This Visit          Medium    28 weeks gestation of pregnancy ([facility]) - Primary    Overview     Desired provider in labor: [x] CNM  [] Physician  [x] Blood Products: [x] Yes, accepts [] No, needs counseling  [x] Initial BMI: 23.92   [x] Prenatal Labs:  UTD  [x] Cervical Cancer Screening up to date-  normal pap  - plan postpartum pap   [x] Rh status: positive   [x] Genetic Screening:  RR NIPS - female - \"Mcwilliams\"  [x] NT US: (11-13 wks) WNL  [x] Baby ASA (if indicated): taking   [x] Pregnancy dated by: 13 week USN     [x] Anatomy US: (19-20 wks): WNL   [] Federal Sterilization consent signed (if indicated):  [x] 1hr GCT at 24-28wks: WNL   [] Fetal Surveillance (if indicated):<> NSTs at 32 weeks   [x] Tdap (27-32 wks, may be given up to 36 wks if initial window missed): TDAP given    [] RSV (32-36 wks) (Sept. to end of ): desires->  [x] Flu Vaccine: <>Flu given at work   [x] Updated COVID vaccine recommended     [x] Breastfeeding: pump form " submitted 10/18    [] Postpartum Birth control method:   [] GBS at 36 - 37 wks:  [] 39 weeks discussion of IOL vs. Expectant management:  [] Mode of delivery ( anticipated ):          High risk multigravida in third trimester ([facility])     Other Visit Diagnoses       Need for Tdap vaccination        Relevant Orders    Tdap vaccine, age 7 years and older  (BOOSTRIX) (Completed)    Prenatal care, antepartum ([facility])        Relevant Orders    Tdap vaccine, age 7 years and older  (BOOSTRIX) (Completed)          Reviewed lab results starting taking iron for about the past week, discussed would recheck CBC in 1-2 weeks (next visit)  TDAP given today   Reviewed s/sx of PTL, warning signs, fetal movement counts, and when to call provider  Follow up in 2 week for a routine prenatal visit.    Belgica Fisher, ARIELA-IVYM

## 2024-11-14 ENCOUNTER — APPOINTMENT (OUTPATIENT)
Dept: OBSTETRICS AND GYNECOLOGY | Facility: CLINIC | Age: 34
End: 2024-11-14
Payer: COMMERCIAL

## 2024-11-18 ENCOUNTER — TELEPHONE (OUTPATIENT)
Dept: OBSTETRICS AND GYNECOLOGY | Facility: CLINIC | Age: 34
End: 2024-11-18
Payer: COMMERCIAL

## 2024-11-19 ENCOUNTER — APPOINTMENT (OUTPATIENT)
Dept: OBSTETRICS AND GYNECOLOGY | Facility: CLINIC | Age: 34
End: 2024-11-19
Payer: COMMERCIAL

## 2024-11-19 VITALS — DIASTOLIC BLOOD PRESSURE: 87 MMHG | WEIGHT: 162.6 LBS | BODY MASS INDEX: 28.8 KG/M2 | SYSTOLIC BLOOD PRESSURE: 131 MMHG

## 2024-11-19 DIAGNOSIS — O10.911 PRE-EXISTING HYPERTENSION DURING PREGNANCY IN FIRST TRIMESTER, UNSPECIFIED PRE-EXISTING HYPERTENSION TYPE (HHS-HCC): ICD-10-CM

## 2024-11-19 DIAGNOSIS — O99.013 ANEMIA AFFECTING PREGNANCY IN THIRD TRIMESTER (HHS-HCC): ICD-10-CM

## 2024-11-19 DIAGNOSIS — Z3A.30 30 WEEKS GESTATION OF PREGNANCY (HHS-HCC): Primary | ICD-10-CM

## 2024-11-19 LAB
ERYTHROCYTE [DISTWIDTH] IN BLOOD BY AUTOMATED COUNT: 12.9 % (ref 11.5–14.5)
HCT VFR BLD AUTO: 31.6 % (ref 36–46)
HGB BLD-MCNC: 10.5 G/DL (ref 12–16)
HGB RETIC QN: 35 PG (ref 28–38)
IMMATURE RETIC FRACTION: 17.6 %
MCH RBC QN AUTO: 32.7 PG (ref 26–34)
MCHC RBC AUTO-ENTMCNC: 33.2 G/DL (ref 32–36)
MCV RBC AUTO: 98 FL (ref 80–100)
NRBC BLD-RTO: 0 /100 WBCS (ref 0–0)
PLATELET # BLD AUTO: 170 X10*3/UL (ref 150–450)
RBC # BLD AUTO: 3.21 X10*6/UL (ref 4–5.2)
RETICS #: 0.12 X10*6/UL (ref 0.02–0.08)
RETICS/RBC NFR AUTO: 3.7 % (ref 0.5–2)
WBC # BLD AUTO: 7.5 X10*3/UL (ref 4.4–11.3)

## 2024-11-19 PROCEDURE — 85045 AUTOMATED RETICULOCYTE COUNT: CPT

## 2024-11-19 PROCEDURE — 82607 VITAMIN B-12: CPT

## 2024-11-19 PROCEDURE — 83550 IRON BINDING TEST: CPT

## 2024-11-19 PROCEDURE — RXMED WILLOW AMBULATORY MEDICATION CHARGE

## 2024-11-19 PROCEDURE — 85027 COMPLETE CBC AUTOMATED: CPT

## 2024-11-19 PROCEDURE — 82746 ASSAY OF FOLIC ACID SERUM: CPT

## 2024-11-19 PROCEDURE — 82728 ASSAY OF FERRITIN: CPT

## 2024-11-19 PROCEDURE — 0501F PRENATAL FLOW SHEET: CPT | Performed by: OBSTETRICS & GYNECOLOGY

## 2024-11-19 RX ORDER — FERROUS SULFATE 325(65) MG
65 TABLET, DELAYED RELEASE (ENTERIC COATED) ORAL
COMMUNITY

## 2024-11-19 NOTE — PROGRESS NOTES
"Patient presents for OBFU  Anemia labs today  C/O: fell yesterday. +FM, no bleeding or cramping     Kayy Holder MA II    Routine prenatal visit     Subjective    HPI:  Doing well overall.  Bps well controlled.  Will get RSV vaccine next visit.  Got breast pump.  Start weekly  testing at 32 weeks- has growth USN at 32 weeks - start Nsts the week after.     Objective    Vital Signs  /87   Wt 73.8 kg (162 lb 9.6 oz)   LMP 04/15/2024 (Exact Date)   BMI 28.80 kg/m²     Tamera Isaac is a 34 y.o. yo  at 30w2d here for the following concerns which we addressed today:     Medical Problems       Problem List       Anxiety    30 weeks gestation of pregnancy ([facility])    Overview Addendum 2024  4:57 PM by Carla Palma MD     Desired provider in labor: [x] CNM  [] Physician  [x] Blood Products: [x] Yes, accepts [] No, needs counseling  [x] Initial BMI: 23.92   [x] Prenatal Labs:  UTD  [x] Cervical Cancer Screening up to date-  normal pap  - plan postpartum pap   [x] Rh status: positive   [x] Genetic Screening:  RR NIPS - female - \"Mcwilliams\"  [x] NT US: (11-13 wks) WNL  [x] Baby ASA (if indicated): taking   [x] Pregnancy dated by: 13 week USN     [x] Anatomy US: (19-20 wks): WNL   [] Federal Sterilization consent signed (if indicated):  [x] 1hr GCT at 24-28wks: WNL   [] Fetal Surveillance (if indicated):<> NSTs at 32 weeks   [x] Tdap: given    [] RSV: <> next visit   [x] Flu Vaccine:  given at work   [x] Updated COVID vaccine recommended     [x] Breastfeeding: pump form submitted 10/18    [] Postpartum Birth control method:   [] GBS at 36 - 37 wks:  [] 39 weeks discussion of IOL vs. Expectant management:  [] Mode of delivery ( anticipated ):          Pre-existing hypertension during pregnancy in first trimester ([facility])    Overview Addendum 2024  4:57 PM by Carla Palma MD     - on nifedipine XL 60mg daily   - on ASA prophylaxis   - normal Baseline preeclampsia " labs   - normal growth 28 weeks  <> repeat USN 32 weeks - scheduled   <> NSTs 32 weeks  <> Delivery 38.0-39.6          Anemia affecting pregnancy in third trimester ([facility])    Overview Signed 11/19/2024  4:58 PM by Carla Palma MD     - oral iron   <> repeat anemia labs sent today          High risk multigravida in third trimester ([facility])        Follow up in 2 week(s).

## 2024-11-20 ENCOUNTER — PHARMACY VISIT (OUTPATIENT)
Dept: PHARMACY | Facility: CLINIC | Age: 34
End: 2024-11-20
Payer: COMMERCIAL

## 2024-11-20 LAB
FERRITIN SERPL-MCNC: 42 NG/ML
FERRITIN SERPL-MCNC: 43 NG/ML
FOLATE SERPL-MCNC: >24 NG/ML
IRON SATN MFR SERPL: 18 %
IRON SERPL-MCNC: 96 UG/DL
REFLEX ADDED, ANEMIA PANEL: NORMAL
TIBC SERPL-MCNC: 525 UG/DL
UIBC SERPL-MCNC: 429 UG/DL
VIT B12 SERPL-MCNC: 231 PG/ML

## 2024-12-04 ENCOUNTER — APPOINTMENT (OUTPATIENT)
Dept: OBSTETRICS AND GYNECOLOGY | Facility: CLINIC | Age: 34
End: 2024-12-04
Payer: COMMERCIAL

## 2024-12-04 ENCOUNTER — PHARMACY VISIT (OUTPATIENT)
Dept: PHARMACY | Facility: CLINIC | Age: 34
End: 2024-12-04
Payer: COMMERCIAL

## 2024-12-04 VITALS — BODY MASS INDEX: 28.95 KG/M2 | SYSTOLIC BLOOD PRESSURE: 128 MMHG | WEIGHT: 163.4 LBS | DIASTOLIC BLOOD PRESSURE: 86 MMHG

## 2024-12-04 DIAGNOSIS — Z23 ENCOUNTER FOR VACCINATION: ICD-10-CM

## 2024-12-04 DIAGNOSIS — Z3A.32 32 WEEKS GESTATION OF PREGNANCY (HHS-HCC): Primary | ICD-10-CM

## 2024-12-04 DIAGNOSIS — K21.9 GASTROESOPHAGEAL REFLUX DISEASE WITHOUT ESOPHAGITIS: ICD-10-CM

## 2024-12-04 DIAGNOSIS — O99.013 ANEMIA AFFECTING PREGNANCY IN THIRD TRIMESTER (HHS-HCC): ICD-10-CM

## 2024-12-04 DIAGNOSIS — O10.911 PRE-EXISTING HYPERTENSION DURING PREGNANCY IN FIRST TRIMESTER, UNSPECIFIED PRE-EXISTING HYPERTENSION TYPE (HHS-HCC): ICD-10-CM

## 2024-12-04 PROCEDURE — 90678 RSV VACC PREF BIVALENT IM: CPT | Performed by: OBSTETRICS & GYNECOLOGY

## 2024-12-04 PROCEDURE — 0501F PRENATAL FLOW SHEET: CPT | Performed by: OBSTETRICS & GYNECOLOGY

## 2024-12-04 PROCEDURE — 90471 IMMUNIZATION ADMIN: CPT | Performed by: OBSTETRICS & GYNECOLOGY

## 2024-12-04 PROCEDURE — RXMED WILLOW AMBULATORY MEDICATION CHARGE

## 2024-12-04 RX ORDER — PANTOPRAZOLE SODIUM 40 MG/1
40 TABLET, DELAYED RELEASE ORAL
Qty: 30 TABLET | Refills: 3 | Status: SHIPPED | OUTPATIENT
Start: 2024-12-04 | End: 2025-12-04

## 2024-12-04 NOTE — PROGRESS NOTES
"Patient presents for OBFU  RSV: Accepts   C/O: BPP this Friday     Kayy Holder MA II    Routine prenatal visit     Subjective    HPI:  Having reflux symptoms that wake her up overnight.  Taking pepcid which isn't adequate. Prescription for protonix sent.  Start weekly  testing now - did not do NST today as she has a growth USN in 2 days.  Discussed delivery timing with cHTN on meds.  Questions answered.     Objective    Vital Signs  /86   Wt 74.1 kg (163 lb 6.4 oz)   LMP 04/15/2024 (Exact Date)   BMI 28.95 kg/m²     Tamera Isaac is a 34 y.o. yo  at 32w3d here for the following concerns which we addressed today:     Medical Problems       Problem List       Anxiety    32 weeks gestation of pregnancy ([facility])    Overview Addendum 2024  5:22 PM by Carla Palma MD     Desired provider in labor: [x] CNM  [] Physician  [x] Blood Products: [x] Yes, accepts [] No, needs counseling  [x] Initial BMI: 23.92   [x] Prenatal Labs:  UTD  [x] Cervical Cancer Screening up to date-  normal pap  - plan postpartum pap   [x] Rh status: positive   [x] Genetic Screening:  RR NIPS - female - \"Mcwilliams\"  [x] NT US: (11-13 wks) WNL  [x] Baby ASA (if indicated): taking   [x] Pregnancy dated by: 13 week USN     [x] Anatomy US: (19-20 wks): WNL   [] Federal Sterilization consent signed (if indicated):  [x] 1hr GCT at 24-28wks: WNL   [] Fetal Surveillance (if indicated):<> NSTs at 32 weeks   [x] Tdap: given    [x] RSV: given    [x] Flu Vaccine:  given at work   [x] Updated COVID vaccine recommended     [x] Breastfeeding: pump form submitted 10/18    [] Postpartum Birth control method:   [] GBS at 36 - 37 wks:  [] 39 weeks discussion of IOL vs. Expectant management:  [] Mode of delivery ( anticipated ):          Pre-existing hypertension during pregnancy in first trimester ([facility])    Overview Addendum 2024  4:57 PM by Carla Palma MD     - on nifedipine XL 60mg daily   - on " ASA prophylaxis   - normal Baseline preeclampsia labs   - normal growth 28 weeks  <> repeat USN 32 weeks - scheduled   <> NSTs 32 weeks  <> Delivery 38.0-39.6          Anemia affecting pregnancy in third trimester ([facility])    Overview Addendum 12/4/2024  5:22 PM by Carla Palma MD     - taking oral iron          High risk multigravida in third trimester ([facility])        Follow up in 1 week(s).

## 2024-12-06 ENCOUNTER — HOSPITAL ENCOUNTER (OUTPATIENT)
Dept: RADIOLOGY | Facility: CLINIC | Age: 34
Discharge: HOME | End: 2024-12-06
Payer: COMMERCIAL

## 2024-12-06 ENCOUNTER — APPOINTMENT (OUTPATIENT)
Dept: OBSTETRICS AND GYNECOLOGY | Facility: CLINIC | Age: 34
End: 2024-12-06
Payer: COMMERCIAL

## 2024-12-06 DIAGNOSIS — Z34.90 PRENATAL CARE, ANTEPARTUM (HHS-HCC): ICD-10-CM

## 2024-12-06 PROCEDURE — 76819 FETAL BIOPHYS PROFIL W/O NST: CPT

## 2024-12-06 PROCEDURE — 76816 OB US FOLLOW-UP PER FETUS: CPT

## 2024-12-13 ENCOUNTER — APPOINTMENT (OUTPATIENT)
Dept: OBSTETRICS AND GYNECOLOGY | Facility: CLINIC | Age: 34
End: 2024-12-13
Payer: COMMERCIAL

## 2024-12-13 VITALS — WEIGHT: 166 LBS | BODY MASS INDEX: 29.41 KG/M2 | SYSTOLIC BLOOD PRESSURE: 123 MMHG | DIASTOLIC BLOOD PRESSURE: 83 MMHG

## 2024-12-13 DIAGNOSIS — Z3A.33 33 WEEKS GESTATION OF PREGNANCY (HHS-HCC): Primary | ICD-10-CM

## 2024-12-13 DIAGNOSIS — O10.911 PRE-EXISTING HYPERTENSION DURING PREGNANCY IN FIRST TRIMESTER, UNSPECIFIED PRE-EXISTING HYPERTENSION TYPE (HHS-HCC): ICD-10-CM

## 2024-12-13 DIAGNOSIS — O99.013 ANEMIA AFFECTING PREGNANCY IN THIRD TRIMESTER (HHS-HCC): ICD-10-CM

## 2024-12-13 NOTE — PROCEDURES
Tamera Isaac, a  at 33w5d with an DIONNE of 2025, by Ultrasound, was seen at [facility] for a nonstress test.    Non-Stress Test   Baseline Fetal Heart Rate for Non-Stress Test: 135 BPM  Variability in Waveform for Non-Stress Test: Moderate  Accelerations in Non-Stress Test: Yes  Decelerations in Non-Stress Test: None  Contractions in Non-Stress Test: Not present  Interpretation of Non-Stress Test   Interpretation of Non-Stress Test: Reactive

## 2024-12-18 ENCOUNTER — PREP FOR PROCEDURE (OUTPATIENT)
Dept: OBSTETRICS AND GYNECOLOGY | Facility: HOSPITAL | Age: 34
End: 2024-12-18

## 2024-12-18 ENCOUNTER — APPOINTMENT (OUTPATIENT)
Dept: OBSTETRICS AND GYNECOLOGY | Facility: CLINIC | Age: 34
End: 2024-12-18
Payer: COMMERCIAL

## 2024-12-18 VITALS — DIASTOLIC BLOOD PRESSURE: 86 MMHG | WEIGHT: 172 LBS | BODY MASS INDEX: 30.47 KG/M2 | SYSTOLIC BLOOD PRESSURE: 130 MMHG

## 2024-12-18 DIAGNOSIS — O10.911 PRE-EXISTING HYPERTENSION DURING PREGNANCY IN FIRST TRIMESTER, UNSPECIFIED PRE-EXISTING HYPERTENSION TYPE (HHS-HCC): ICD-10-CM

## 2024-12-18 DIAGNOSIS — O10.911 PRE-EXISTING HYPERTENSION DURING PREGNANCY IN FIRST TRIMESTER, UNSPECIFIED PRE-EXISTING HYPERTENSION TYPE (HHS-HCC): Primary | ICD-10-CM

## 2024-12-18 DIAGNOSIS — O10.919 CHRONIC HYPERTENSION IN PREGNANCY (HHS-HCC): Primary | ICD-10-CM

## 2024-12-18 DIAGNOSIS — O99.013 ANEMIA AFFECTING PREGNANCY IN THIRD TRIMESTER (HHS-HCC): ICD-10-CM

## 2024-12-18 DIAGNOSIS — Z3A.34 34 WEEKS GESTATION OF PREGNANCY (HHS-HCC): Primary | ICD-10-CM

## 2024-12-18 PROCEDURE — 0501F PRENATAL FLOW SHEET: CPT | Performed by: OBSTETRICS & GYNECOLOGY

## 2024-12-18 PROCEDURE — 59025 FETAL NON-STRESS TEST: CPT | Performed by: OBSTETRICS & GYNECOLOGY

## 2024-12-18 NOTE — PROCEDURES
Tamera Isaac, a  at 34w3d with an DIONNE of 2025, by Ultrasound, was seen at [facility] for a nonstress test.    Non-Stress Test   Baseline Fetal Heart Rate for Non-Stress Test: 140 BPM  Variability in Waveform for Non-Stress Test: Moderate  Accelerations in Non-Stress Test: Yes  Decelerations in Non-Stress Test: None  Contractions in Non-Stress Test: Not present  Acoustic Stimulator for Non-Stress Test: No  Interpretation of Non-Stress Test   Interpretation of Non-Stress Test: Reactive

## 2024-12-18 NOTE — PROGRESS NOTES
"Patient presents for OBFU / NST - cHTN   C/O: tired, not sleeping    Kayy Holder MA II    Routine prenatal visit     Subjective    HPI:  No complaints.  Normal FM.  Has next growth USN scheduled.  Bps remain well controlled.  Request for IOL submitted for 1/15 at 8am - she will be 38.3.   Reactive NST today - continue weekly testing.     Objective    Vital Signs  /86   Wt 78 kg (172 lb)   LMP 04/15/2024 (Exact Date)   BMI 30.47 kg/m²     Tamera Isaac is a 34 y.o. yo  at 34w3d here for the following concerns which we addressed today:     Medical Problems       Problem List       Anxiety    34 weeks gestation of pregnancy ([facility])    Overview Addendum 2024  3:35 PM by Carla Palma MD     Desired provider in labor: [x] CNM  [] Physician  [x] Blood Products: [x] Yes, accepts [] No, needs counseling  [x] Initial BMI: 23.92   [x] Prenatal Labs:  UTD  [x] Cervical Cancer Screening up to date-  normal pap  - plan postpartum pap   [x] Rh status: positive   [x] Genetic Screening:  RR NIPS - female - \"Mcwilliams\"  [x] NT US: (11-13 wks) WNL  [x] Baby ASA (if indicated): taking   [x] Pregnancy dated by: 13 week USN     [x] Anatomy US: (19-20 wks): WNL   [] Federal Sterilization consent signed (if indicated):  [x] 1hr GCT at 24-28wks: WNL   [x] Fetal Surveillance (if indicated):<> NSTs at 32 weeks   [x] Tdap: given    [x] RSV: given    [x] Flu Vaccine:  given at work   [x] Updated COVID vaccine recommended     [x] Breastfeeding: pump form submitted 10/18    [] Postpartum Birth control method:   [] GBS at 36 - 37 wks:  [] 39 weeks discussion of IOL vs. Expectant management: IOL requested 1/15 at 8am    [] Mode of delivery ( anticipated ):          Pre-existing hypertension during pregnancy in first trimester ([facility])    Overview Addendum 2024 11:23 AM by Carla Palma MD     - on nifedipine XL 60mg daily   - on ASA prophylaxis   - normal Baseline preeclampsia labs   - " normal growth 28 weeks  - repeat USN 32 weeks - EFW 80%  <> f/u USN 3 weeks   <> NSTs 32 weeks  <> Delivery 38.0-39.6          Anemia affecting pregnancy in third trimester ([facility])    Overview Addendum 12/4/2024  5:22 PM by Carla Palma MD     - taking oral iron          High risk multigravida in third trimester ([facility])        Follow up in 1 week(s).

## 2024-12-23 ENCOUNTER — APPOINTMENT (OUTPATIENT)
Dept: OBSTETRICS AND GYNECOLOGY | Facility: CLINIC | Age: 34
End: 2024-12-23
Payer: COMMERCIAL

## 2024-12-23 VITALS — WEIGHT: 170.2 LBS | DIASTOLIC BLOOD PRESSURE: 86 MMHG | SYSTOLIC BLOOD PRESSURE: 137 MMHG | BODY MASS INDEX: 30.15 KG/M2

## 2024-12-23 DIAGNOSIS — O10.911 PRE-EXISTING HYPERTENSION DURING PREGNANCY IN FIRST TRIMESTER, UNSPECIFIED PRE-EXISTING HYPERTENSION TYPE (HHS-HCC): ICD-10-CM

## 2024-12-23 DIAGNOSIS — Z3A.35 35 WEEKS GESTATION OF PREGNANCY (HHS-HCC): Primary | ICD-10-CM

## 2024-12-23 PROCEDURE — 59025 FETAL NON-STRESS TEST: CPT | Performed by: OBSTETRICS & GYNECOLOGY

## 2024-12-23 PROCEDURE — 0501F PRENATAL FLOW SHEET: CPT | Performed by: OBSTETRICS & GYNECOLOGY

## 2024-12-23 NOTE — PROGRESS NOTES
"Patient presents for OBFU  NST - cHTN   C/O: None    Kayy Holder MA II    Routine prenatal visit     Subjective    HPI:  Feeling well - no issues.  Reactive NST.  Continue weekly.  Has growth USN scheduled 1/3.  GBS next visit.  IOL scheduled 1/15.     Objective    Vital Signs  /86   Wt 77.2 kg (170 lb 3.2 oz)   LMP 04/15/2024 (Exact Date)   BMI 30.15 kg/m²     Tamera Isaac is a 34 y.o. yo  at 35w1d here for the following concerns which we addressed today:     Medical Problems       Problem List       Anxiety    35 weeks gestation of pregnancy ([facility])    Overview Addendum 2024 12:40 PM by Carla Palma MD     Desired provider in labor: [x] CNM  [] Physician  [x] Blood Products: [x] Yes, accepts [] No, needs counseling  [x] Initial BMI: 23.92   [x] Prenatal Labs:  UTD  [x] Cervical Cancer Screening up to date-  normal pap  - plan postpartum pap   [x] Rh status: positive   [x] Genetic Screening:  RR NIPS - female - \"Mcwilliams\"  [x] NT US: (11-13 wks) WNL  [x] Baby ASA (if indicated): taking   [x] Pregnancy dated by: 13 week USN     [x] Anatomy US: (19-20 wks): WNL   [x] 1hr GCT at 24-28wks: WNL   [x] Fetal Surveillance (if indicated):<> NSTs at 32 weeks   [x] Tdap: given    [x] RSV: given    [x] Flu Vaccine:  given at work   [x] Updated COVID vaccine recommended     [x] Breastfeeding: pump form submitted 10/18    [] Postpartum Birth control method:   [] GBS at 36 - 37 wks:  [x] 39 weeks discussion of IOL vs. Expectant management: IOL scheduled 1/15 at 8am    [x] Mode of delivery ( anticipated ):          Pre-existing hypertension during pregnancy in first trimester ([facility])    Overview Addendum 2024 12:41 PM by Carla Palma MD     - on nifedipine XL 60mg daily   - on ASA prophylaxis   - normal Baseline preeclampsia labs   - normal growth 28 weeks  - repeat USN 32 weeks - EFW 80%  <> growth USN scheduled 1/3   <> NSTs 32 weeks  - IOL scheduled 1/15 at 8am "          Anemia affecting pregnancy in third trimester ([facility])    Overview Addendum 12/4/2024  5:22 PM by Carla Palma MD     - taking oral iron          High risk multigravida in third trimester ([facility])        Follow up in 1 week(s).

## 2024-12-23 NOTE — PROCEDURES
Tamera Isaac, a  at 35w1d with an DIONNE of 2025, by Ultrasound, was seen at [facility] for a nonstress test.    Non-Stress Test   Baseline Fetal Heart Rate for Non-Stress Test: 130 BPM  Variability in Waveform for Non-Stress Test: Moderate  Accelerations in Non-Stress Test: Yes  Decelerations in Non-Stress Test: None  Contractions in Non-Stress Test: Not present  Acoustic Stimulator for Non-Stress Test: No  Interpretation of Non-Stress Test   Interpretation of Non-Stress Test: Reactive

## 2024-12-31 ENCOUNTER — APPOINTMENT (OUTPATIENT)
Dept: OBSTETRICS AND GYNECOLOGY | Facility: CLINIC | Age: 34
End: 2024-12-31
Payer: COMMERCIAL

## 2024-12-31 VITALS — BODY MASS INDEX: 30.11 KG/M2 | DIASTOLIC BLOOD PRESSURE: 90 MMHG | SYSTOLIC BLOOD PRESSURE: 138 MMHG | WEIGHT: 170 LBS

## 2024-12-31 DIAGNOSIS — Z3A.36 36 WEEKS GESTATION OF PREGNANCY (HHS-HCC): Primary | ICD-10-CM

## 2024-12-31 DIAGNOSIS — O10.911 PRE-EXISTING HYPERTENSION DURING PREGNANCY IN FIRST TRIMESTER, UNSPECIFIED PRE-EXISTING HYPERTENSION TYPE (HHS-HCC): ICD-10-CM

## 2024-12-31 DIAGNOSIS — O99.013 ANEMIA AFFECTING PREGNANCY IN THIRD TRIMESTER (HHS-HCC): ICD-10-CM

## 2024-12-31 PROCEDURE — 87081 CULTURE SCREEN ONLY: CPT

## 2024-12-31 PROCEDURE — 0501F PRENATAL FLOW SHEET: CPT | Performed by: OBSTETRICS & GYNECOLOGY

## 2024-12-31 NOTE — PROGRESS NOTES
"Routine prenatal visit     Subjective    HPI:  More uncomfortable this week. Not sleeping well. Has been working a lot so just exhausted.  Having some [place] figueroa contractions.  GBS done today.  Cervix 1cm/50%. Did not do NST today as she has growth USN in 3 days. Continue weekly  testing.  Bps overall well controlled.     Objective    Vital Signs  /90   Wt 77.1 kg (170 lb)   LMP 04/15/2024 (Exact Date)   BMI 30.11 kg/m²     Tamera Isaac is a 34 y.o. yo  at 36w2d here for the following concerns which we addressed today:     Medical Problems       Problem List       Anxiety    36 weeks gestation of pregnancy ([facility])    Overview Addendum 2024  8:37 AM by Carla Palma MD     Desired provider in labor: [x] CNM  [] Physician  [x] Blood Products: [x] Yes, accepts [] No, needs counseling  [x] Initial BMI: 23.92   [x] Prenatal Labs:  UTD  [x] Cervical Cancer Screening up to date-  normal pap  - plan postpartum pap   [x] Rh status: positive   [x] Genetic Screening:  RR NIPS - female - \"Mcwilliams\"  [x] NT US: (11-13 wks) WNL  [x] Baby ASA (if indicated): taking   [x] Pregnancy dated by: 13 week USN     [x] Anatomy US: (19-20 wks): WNL   [x] 1hr GCT at 24-28wks: WNL   [x] Fetal Surveillance (if indicated):<> NSTs at 32 weeks   [x] Tdap: given    [x] RSV: given    [x] Flu Vaccine:  given at work   [x] Updated COVID vaccine recommended     [x] Breastfeeding: pump form submitted 10/18    [] Postpartum Birth control method:   [] GBS at 36 - 37 wks: done today    [x] 39 weeks discussion of IOL vs. Expectant management: IOL scheduled 1/15 at 8am    [x] Mode of delivery ( anticipated ):          Pre-existing hypertension during pregnancy in first trimester ([facility])    Overview Addendum 2024 12:41 PM by Carla Palma MD     - on nifedipine XL 60mg daily   - on ASA prophylaxis   - normal Baseline preeclampsia labs   - normal growth 28 weeks  - repeat USN 32 " weeks - EFW 80%  <> growth USN scheduled 1/3   <> NSTs 32 weeks  - IOL scheduled 1/15 at 8am          Anemia affecting pregnancy in third trimester ([facility])    Overview Addendum 12/4/2024  5:22 PM by Carla Palma MD     - taking oral iron          High risk multigravida in third trimester ([facility])        Follow up in 1 week(s).

## 2025-01-03 ENCOUNTER — HOSPITAL ENCOUNTER (OUTPATIENT)
Dept: RADIOLOGY | Facility: CLINIC | Age: 35
Discharge: HOME | End: 2025-01-03
Payer: COMMERCIAL

## 2025-01-03 DIAGNOSIS — Z34.90 PRENATAL CARE, ANTEPARTUM (HHS-HCC): ICD-10-CM

## 2025-01-03 LAB — GP B STREP GENITAL QL CULT: NORMAL

## 2025-01-03 PROCEDURE — 76819 FETAL BIOPHYS PROFIL W/O NST: CPT

## 2025-01-03 PROCEDURE — 76816 OB US FOLLOW-UP PER FETUS: CPT

## 2025-01-07 RX ORDER — NIFEDIPINE 60 MG/1
TABLET, FILM COATED, EXTENDED RELEASE ORAL
Qty: 330 TABLET | Refills: 0 | Status: CANCELLED | OUTPATIENT
Start: 2025-01-07

## 2025-01-08 ENCOUNTER — PHARMACY VISIT (OUTPATIENT)
Dept: PHARMACY | Facility: CLINIC | Age: 35
End: 2025-01-08
Payer: COMMERCIAL

## 2025-01-08 DIAGNOSIS — I10 ESSENTIAL HYPERTENSION: Primary | ICD-10-CM

## 2025-01-08 PROCEDURE — RXMED WILLOW AMBULATORY MEDICATION CHARGE

## 2025-01-08 RX ORDER — NIFEDIPINE 60 MG/1
TABLET, FILM COATED, EXTENDED RELEASE ORAL
Qty: 330 TABLET | Refills: 0 | Status: ON HOLD | OUTPATIENT
Start: 2025-01-08

## 2025-01-10 ENCOUNTER — HOSPITAL ENCOUNTER (INPATIENT)
Facility: HOSPITAL | Age: 35
LOS: 3 days | Discharge: HOME | End: 2025-01-13
Attending: OBSTETRICS & GYNECOLOGY | Admitting: OBSTETRICS & GYNECOLOGY
Payer: COMMERCIAL

## 2025-01-10 ENCOUNTER — ROUTINE PRENATAL (OUTPATIENT)
Dept: OBSTETRICS AND GYNECOLOGY | Facility: CLINIC | Age: 35
End: 2025-01-10
Payer: COMMERCIAL

## 2025-01-10 ENCOUNTER — ANESTHESIA (OUTPATIENT)
Dept: OBSTETRICS AND GYNECOLOGY | Facility: HOSPITAL | Age: 35
End: 2025-01-10
Payer: COMMERCIAL

## 2025-01-10 ENCOUNTER — ANESTHESIA EVENT (OUTPATIENT)
Dept: OBSTETRICS AND GYNECOLOGY | Facility: HOSPITAL | Age: 35
End: 2025-01-10
Payer: COMMERCIAL

## 2025-01-10 ENCOUNTER — APPOINTMENT (OUTPATIENT)
Dept: OBSTETRICS AND GYNECOLOGY | Facility: CLINIC | Age: 35
End: 2025-01-10
Payer: COMMERCIAL

## 2025-01-10 VITALS — BODY MASS INDEX: 31.04 KG/M2 | DIASTOLIC BLOOD PRESSURE: 87 MMHG | SYSTOLIC BLOOD PRESSURE: 134 MMHG | WEIGHT: 175.2 LBS

## 2025-01-10 DIAGNOSIS — O10.911 PRE-EXISTING HYPERTENSION DURING PREGNANCY IN FIRST TRIMESTER, UNSPECIFIED PRE-EXISTING HYPERTENSION TYPE (HHS-HCC): ICD-10-CM

## 2025-01-10 DIAGNOSIS — Z3A.36 36 WEEKS GESTATION OF PREGNANCY (HHS-HCC): Primary | ICD-10-CM

## 2025-01-10 DIAGNOSIS — O99.013 ANEMIA AFFECTING PREGNANCY IN THIRD TRIMESTER (HHS-HCC): ICD-10-CM

## 2025-01-10 DIAGNOSIS — R52 PAIN: Primary | ICD-10-CM

## 2025-01-10 LAB
ABO GROUP (TYPE) IN BLOOD: NORMAL
ALBUMIN SERPL BCP-MCNC: 3.5 G/DL (ref 3.4–5)
ALP SERPL-CCNC: 129 U/L (ref 33–110)
ALT SERPL W P-5'-P-CCNC: 10 U/L (ref 7–45)
ANION GAP SERPL CALC-SCNC: 15 MMOL/L (ref 10–20)
ANTIBODY SCREEN: NORMAL
AST SERPL W P-5'-P-CCNC: 17 U/L (ref 9–39)
BILIRUB SERPL-MCNC: 0.3 MG/DL (ref 0–1.2)
BUN SERPL-MCNC: 11 MG/DL (ref 6–23)
CALCIUM SERPL-MCNC: 8 MG/DL (ref 8.6–10.3)
CHLORIDE SERPL-SCNC: 104 MMOL/L (ref 98–107)
CO2 SERPL-SCNC: 20 MMOL/L (ref 21–32)
CREAT SERPL-MCNC: 0.6 MG/DL (ref 0.5–1.05)
CREAT UR-MCNC: 137.6 MG/DL (ref 20–320)
EGFRCR SERPLBLD CKD-EPI 2021: >90 ML/MIN/1.73M*2
ERYTHROCYTE [DISTWIDTH] IN BLOOD BY AUTOMATED COUNT: 12.4 % (ref 11.5–14.5)
GLUCOSE SERPL-MCNC: 85 MG/DL (ref 74–99)
HCT VFR BLD AUTO: 32.6 % (ref 36–46)
HGB BLD-MCNC: 11.1 G/DL (ref 12–16)
MCH RBC QN AUTO: 31.7 PG (ref 26–34)
MCHC RBC AUTO-ENTMCNC: 34 G/DL (ref 32–36)
MCV RBC AUTO: 93 FL (ref 80–100)
NRBC BLD-RTO: 0 /100 WBCS (ref 0–0)
PLATELET # BLD AUTO: 168 X10*3/UL (ref 150–450)
POTASSIUM SERPL-SCNC: 3.7 MMOL/L (ref 3.5–5.3)
PROT SERPL-MCNC: 6.3 G/DL (ref 6.4–8.2)
PROT UR-ACNC: 24 MG/DL (ref 5–24)
PROT/CREAT UR: 0.17 MG/MG CREAT (ref 0–0.17)
RBC # BLD AUTO: 3.5 X10*6/UL (ref 4–5.2)
RH FACTOR (ANTIGEN D): NORMAL
SODIUM SERPL-SCNC: 135 MMOL/L (ref 136–145)
WBC # BLD AUTO: 7.8 X10*3/UL (ref 4.4–11.3)

## 2025-01-10 PROCEDURE — 2500000001 HC RX 250 WO HCPCS SELF ADMINISTERED DRUGS (ALT 637 FOR MEDICARE OP): Performed by: OBSTETRICS & GYNECOLOGY

## 2025-01-10 PROCEDURE — 2500000004 HC RX 250 GENERAL PHARMACY W/ HCPCS (ALT 636 FOR OP/ED): Performed by: OBSTETRICS & GYNECOLOGY

## 2025-01-10 PROCEDURE — 86780 TREPONEMA PALLIDUM: CPT | Mod: STJLAB | Performed by: OBSTETRICS & GYNECOLOGY

## 2025-01-10 PROCEDURE — 85027 COMPLETE CBC AUTOMATED: CPT | Performed by: OBSTETRICS & GYNECOLOGY

## 2025-01-10 PROCEDURE — 3E033VJ INTRODUCTION OF OTHER HORMONE INTO PERIPHERAL VEIN, PERCUTANEOUS APPROACH: ICD-10-PCS | Performed by: OBSTETRICS & GYNECOLOGY

## 2025-01-10 PROCEDURE — 59050 FETAL MONITOR W/REPORT: CPT

## 2025-01-10 PROCEDURE — 2500000004 HC RX 250 GENERAL PHARMACY W/ HCPCS (ALT 636 FOR OP/ED): Performed by: STUDENT IN AN ORGANIZED HEALTH CARE EDUCATION/TRAINING PROGRAM

## 2025-01-10 PROCEDURE — 7210000002 HC LABOR PER HOUR

## 2025-01-10 PROCEDURE — 1120000001 HC OB PRIVATE ROOM DAILY

## 2025-01-10 PROCEDURE — 82570 ASSAY OF URINE CREATININE: CPT | Performed by: OBSTETRICS & GYNECOLOGY

## 2025-01-10 PROCEDURE — 80053 COMPREHEN METABOLIC PANEL: CPT | Performed by: OBSTETRICS & GYNECOLOGY

## 2025-01-10 PROCEDURE — 36415 COLL VENOUS BLD VENIPUNCTURE: CPT | Performed by: OBSTETRICS & GYNECOLOGY

## 2025-01-10 PROCEDURE — 86901 BLOOD TYPING SEROLOGIC RH(D): CPT | Performed by: OBSTETRICS & GYNECOLOGY

## 2025-01-10 RX ORDER — TRANEXAMIC ACID 100 MG/ML
1000 INJECTION, SOLUTION INTRAVENOUS ONCE AS NEEDED
Status: DISCONTINUED | OUTPATIENT
Start: 2025-01-10 | End: 2025-01-11

## 2025-01-10 RX ORDER — ONDANSETRON HYDROCHLORIDE 2 MG/ML
4 INJECTION, SOLUTION INTRAVENOUS EVERY 6 HOURS PRN
Status: DISCONTINUED | OUTPATIENT
Start: 2025-01-10 | End: 2025-01-11

## 2025-01-10 RX ORDER — ONDANSETRON 4 MG/1
4 TABLET, FILM COATED ORAL EVERY 6 HOURS PRN
Status: DISCONTINUED | OUTPATIENT
Start: 2025-01-10 | End: 2025-01-11

## 2025-01-10 RX ORDER — LABETALOL HYDROCHLORIDE 5 MG/ML
20 INJECTION, SOLUTION INTRAVENOUS ONCE AS NEEDED
Status: DISCONTINUED | OUTPATIENT
Start: 2025-01-10 | End: 2025-01-11

## 2025-01-10 RX ORDER — NIFEDIPINE 10 MG/1
10 CAPSULE ORAL ONCE AS NEEDED
Status: DISCONTINUED | OUTPATIENT
Start: 2025-01-10 | End: 2025-01-11

## 2025-01-10 RX ORDER — OXYTOCIN/0.9 % SODIUM CHLORIDE 30/500 ML
60 PLASTIC BAG, INJECTION (ML) INTRAVENOUS ONCE AS NEEDED
Status: DISCONTINUED | OUTPATIENT
Start: 2025-01-10 | End: 2025-01-11

## 2025-01-10 RX ORDER — HYDRALAZINE HYDROCHLORIDE 20 MG/ML
5 INJECTION INTRAMUSCULAR; INTRAVENOUS ONCE AS NEEDED
Status: DISCONTINUED | OUTPATIENT
Start: 2025-01-10 | End: 2025-01-11

## 2025-01-10 RX ORDER — LIDOCAINE HYDROCHLORIDE 10 MG/ML
30 INJECTION, SOLUTION INFILTRATION; PERINEURAL ONCE AS NEEDED
Status: DISCONTINUED | OUTPATIENT
Start: 2025-01-10 | End: 2025-01-11

## 2025-01-10 RX ORDER — METOCLOPRAMIDE HYDROCHLORIDE 5 MG/ML
10 INJECTION INTRAMUSCULAR; INTRAVENOUS EVERY 6 HOURS PRN
Status: DISCONTINUED | OUTPATIENT
Start: 2025-01-10 | End: 2025-01-11

## 2025-01-10 RX ORDER — TERBUTALINE SULFATE 1 MG/ML
0.25 INJECTION SUBCUTANEOUS ONCE AS NEEDED
Status: DISCONTINUED | OUTPATIENT
Start: 2025-01-10 | End: 2025-01-11

## 2025-01-10 RX ORDER — MISOPROSTOL 200 UG/1
800 TABLET ORAL ONCE AS NEEDED
Status: DISCONTINUED | OUTPATIENT
Start: 2025-01-10 | End: 2025-01-11

## 2025-01-10 RX ORDER — OXYTOCIN/0.9 % SODIUM CHLORIDE 30/500 ML
2-30 PLASTIC BAG, INJECTION (ML) INTRAVENOUS CONTINUOUS
Status: DISCONTINUED | OUTPATIENT
Start: 2025-01-10 | End: 2025-01-11

## 2025-01-10 RX ORDER — FENTANYL/ROPIVACAINE/NS/PF 2MCG/ML-.2
0-25 PLASTIC BAG, INJECTION (ML) INJECTION CONTINUOUS
Status: DISCONTINUED | OUTPATIENT
Start: 2025-01-10 | End: 2025-01-11

## 2025-01-10 RX ORDER — CARBOPROST TROMETHAMINE 250 UG/ML
250 INJECTION, SOLUTION INTRAMUSCULAR ONCE AS NEEDED
Status: DISCONTINUED | OUTPATIENT
Start: 2025-01-10 | End: 2025-01-11

## 2025-01-10 RX ORDER — LOPERAMIDE HYDROCHLORIDE 2 MG/1
4 CAPSULE ORAL EVERY 2 HOUR PRN
Status: DISCONTINUED | OUTPATIENT
Start: 2025-01-10 | End: 2025-01-11

## 2025-01-10 RX ORDER — METOCLOPRAMIDE 10 MG/1
10 TABLET ORAL EVERY 6 HOURS PRN
Status: DISCONTINUED | OUTPATIENT
Start: 2025-01-10 | End: 2025-01-11

## 2025-01-10 RX ORDER — SODIUM CHLORIDE 9 MG/ML
125 INJECTION, SOLUTION INTRAVENOUS CONTINUOUS
Status: DISCONTINUED | OUTPATIENT
Start: 2025-01-10 | End: 2025-01-11

## 2025-01-10 RX ORDER — METHYLERGONOVINE MALEATE 0.2 MG/ML
0.2 INJECTION INTRAVENOUS ONCE AS NEEDED
Status: DISCONTINUED | OUTPATIENT
Start: 2025-01-10 | End: 2025-01-11

## 2025-01-10 RX ORDER — OXYTOCIN 10 [USP'U]/ML
10 INJECTION, SOLUTION INTRAMUSCULAR; INTRAVENOUS ONCE AS NEEDED
Status: DISCONTINUED | OUTPATIENT
Start: 2025-01-10 | End: 2025-01-11

## 2025-01-10 RX ADMIN — Medication 2 MILLI-UNITS/MIN: at 22:02

## 2025-01-10 RX ADMIN — SODIUM CHLORIDE 500 ML: 9 INJECTION, SOLUTION INTRAVENOUS at 23:41

## 2025-01-10 RX ADMIN — MISOPROSTOL 25 MCG: 100 TABLET ORAL at 17:32

## 2025-01-10 RX ADMIN — SODIUM CHLORIDE 125 ML/HR: 9 INJECTION, SOLUTION INTRAVENOUS at 22:02

## 2025-01-10 SDOH — SOCIAL STABILITY: SOCIAL INSECURITY: ARE YOU OR HAVE YOU BEEN THREATENED OR ABUSED PHYSICALLY, EMOTIONALLY, OR SEXUALLY BY ANYONE?: NO

## 2025-01-10 SDOH — HEALTH STABILITY: MENTAL HEALTH: SUICIDAL BEHAVIOR (LIFETIME): NO

## 2025-01-10 SDOH — SOCIAL STABILITY: SOCIAL INSECURITY: VERBAL ABUSE: DENIES

## 2025-01-10 SDOH — HEALTH STABILITY: MENTAL HEALTH: NON-SPECIFIC ACTIVE SUICIDAL THOUGHTS (PAST 1 MONTH): NO

## 2025-01-10 SDOH — SOCIAL STABILITY: SOCIAL INSECURITY: HAVE YOU HAD THOUGHTS OF HARMING ANYONE ELSE?: YES

## 2025-01-10 SDOH — ECONOMIC STABILITY: HOUSING INSECURITY: DO YOU FEEL UNSAFE GOING BACK TO THE PLACE WHERE YOU ARE LIVING?: NO

## 2025-01-10 SDOH — HEALTH STABILITY: MENTAL HEALTH: WISH TO BE DEAD (PAST 1 MONTH): NO

## 2025-01-10 SDOH — HEALTH STABILITY: MENTAL HEALTH: HAVE YOU USED ANY PRESCRIPTION DRUGS OTHER THAN PRESCRIBED IN THE PAST 12 MONTHS?: NO

## 2025-01-10 SDOH — HEALTH STABILITY: MENTAL HEALTH: HAVE YOU USED ANY SUBSTANCES (CANABIS, COCAINE, HEROIN, HALLUCINOGENS, INHALANTS, ETC.) IN THE PAST 12 MONTHS?: NO

## 2025-01-10 SDOH — SOCIAL STABILITY: SOCIAL INSECURITY: ABUSE SCREEN: ADULT

## 2025-01-10 SDOH — SOCIAL STABILITY: SOCIAL INSECURITY: HAVE YOU HAD ANY THOUGHTS OF HARMING ANYONE ELSE?: NO

## 2025-01-10 SDOH — HEALTH STABILITY: MENTAL HEALTH: WERE YOU ABLE TO COMPLETE ALL THE BEHAVIORAL HEALTH SCREENINGS?: YES

## 2025-01-10 SDOH — SOCIAL STABILITY: SOCIAL INSECURITY: HAS ANYONE EVER THREATENED TO HURT YOUR FAMILY OR YOUR PETS?: NO

## 2025-01-10 SDOH — HEALTH STABILITY: MENTAL HEALTH: CURRENT SMOKER: 0

## 2025-01-10 SDOH — SOCIAL STABILITY: SOCIAL INSECURITY: PHYSICAL ABUSE: DENIES

## 2025-01-10 SDOH — SOCIAL STABILITY: SOCIAL INSECURITY: ARE THERE ANY APPARENT SIGNS OF INJURIES/BEHAVIORS THAT COULD BE RELATED TO ABUSE/NEGLECT?: NO

## 2025-01-10 SDOH — SOCIAL STABILITY: SOCIAL INSECURITY: DO YOU FEEL ANYONE HAS EXPLOITED OR TAKEN ADVANTAGE OF YOU FINANCIALLY OR OF YOUR PERSONAL PROPERTY?: NO

## 2025-01-10 SDOH — SOCIAL STABILITY: SOCIAL INSECURITY: DOES ANYONE TRY TO KEEP YOU FROM HAVING/CONTACTING OTHER FRIENDS OR DOING THINGS OUTSIDE YOUR HOME?: NO

## 2025-01-10 ASSESSMENT — PAIN SCALES - GENERAL
PAINLEVEL_OUTOF10: 3
PAINLEVEL_OUTOF10: 0 - NO PAIN
PAINLEVEL_OUTOF10: 4

## 2025-01-10 ASSESSMENT — ACTIVITIES OF DAILY LIVING (ADL)
BATHING: INDEPENDENT
ADEQUATE_TO_COMPLETE_ADL: NO
HEARING - RIGHT EAR: FUNCTIONAL
HEARING - LEFT EAR: FUNCTIONAL
TOILETING: INDEPENDENT
JUDGMENT_ADEQUATE_SAFELY_COMPLETE_DAILY_ACTIVITIES: NO
DRESSING YOURSELF: INDEPENDENT
GROOMING: INDEPENDENT
LACK_OF_TRANSPORTATION: NO
PATIENT'S MEMORY ADEQUATE TO SAFELY COMPLETE DAILY ACTIVITIES?: NO
FEEDING YOURSELF: INDEPENDENT
WALKS IN HOME: INDEPENDENT

## 2025-01-10 ASSESSMENT — LIFESTYLE VARIABLES
AUDIT-C TOTAL SCORE: 0
HOW OFTEN DO YOU HAVE A DRINK CONTAINING ALCOHOL: NEVER
HOW MANY STANDARD DRINKS CONTAINING ALCOHOL DO YOU HAVE ON A TYPICAL DAY: PATIENT DOES NOT DRINK
HOW OFTEN DO YOU HAVE 6 OR MORE DRINKS ON ONE OCCASION: NEVER
SKIP TO QUESTIONS 9-10: 1
AUDIT-C TOTAL SCORE: 0

## 2025-01-10 ASSESSMENT — PATIENT HEALTH QUESTIONNAIRE - PHQ9
1. LITTLE INTEREST OR PLEASURE IN DOING THINGS: NOT AT ALL
2. FEELING DOWN, DEPRESSED OR HOPELESS: NOT AT ALL
SUM OF ALL RESPONSES TO PHQ9 QUESTIONS 1 & 2: 0

## 2025-01-10 NOTE — CARE PLAN
The patient's goals for the shift include  have a baby    The clinical goals for the shift include vitals wdl

## 2025-01-10 NOTE — PROGRESS NOTES
"Patient presents for OBFU / NST - cHTN   C/O: desires cervix check. IOL on 1/15/25    Kayy Holder MA II    Routine prenatal visit     Subjective    HPI:  Pt feeling well overall.  She has noticed worsening leg swelling the past few days.  Having [place]nel[place] contractions but no painful contractions.  BP today initially 158/104 - improved on repeat to 134/87 but given that she is in the delivery window already, recommended delivery.  She is in agreement with this plan.  L&D and Dr. Silva notified.     Objective    Vital Signs  /87   Wt 79.5 kg (175 lb 3.2 oz)   LMP 04/15/2024 (Exact Date)   BMI 31.04 kg/m²     Tamera Isaac is a 34 y.o. yo  at 37w5d here for the following concerns which we addressed today:     Medical Problems       Problem List       Anxiety    36 weeks gestation of pregnancy ([facility])    Overview Addendum 1/10/2025  2:24 PM by Carla Palma MD     Desired provider in labor: [x] CNM  [] Physician  [x] Blood Products: [x] Yes, accepts [] No, needs counseling  [x] Initial BMI: 23.92   [x] Prenatal Labs:  UTD  [x] Cervical Cancer Screening up to date-  normal pap  - plan postpartum pap   [x] Rh status: positive   [x] Genetic Screening:  RR NIPS - female - \"Mcwilliams\"  [x] NT US: (11-13 wks) WNL  [x] Baby ASA (if indicated): taking   [x] Pregnancy dated by: 13 week USN     [x] Anatomy US: (19-20 wks): WNL   [x] 1hr GCT at 24-28wks: WNL   [x] Fetal Surveillance (if indicated):<> NSTs at 32 weeks   [x] Tdap: given    [x] RSV: given    [x] Flu Vaccine:  given at work   [x] Updated COVID vaccine recommended     [x] Breastfeeding: pump form submitted 10/18    [] Postpartum Birth control method:   [x] GBS at 36 - 37 wks: negative   [x] 39 weeks discussion of IOL vs. Expectant management: IOL scheduled 1/15 at 8am    [x] Mode of delivery ( anticipated ):          Pre-existing hypertension during pregnancy in first trimester ([facility])    Overview Addendum 2025 10:31 " AM by Carla Palma MD     - on nifedipine XL 60mg daily   - on ASA prophylaxis   - normal Baseline preeclampsia labs   - normal growth 28 weeks  - USN 36 weeks - EFW 73%  <> weekly NSTs     - IOL scheduled 1/15 at 8am          Anemia affecting pregnancy in third trimester ([facility])    Overview Addendum 12/4/2024  5:22 PM by Carla Palma MD     - taking oral iron          High risk multigravida in third trimester ([facility])        Follow up postpartum.

## 2025-01-10 NOTE — PROCEDURES
Tamera Isaac, a  at 37w5d with an DIONNE of 2025, by Ultrasound, was seen at [facility] for a nonstress test.    Non-Stress Test   Baseline Fetal Heart Rate for Non-Stress Test: 130 BPM  Variability in Waveform for Non-Stress Test: Moderate  Accelerations in Non-Stress Test: Yes  Decelerations in Non-Stress Test: None  Contractions in Non-Stress Test: Not present  Acoustic Stimulator for Non-Stress Test: No  Interpretation of Non-Stress Test   Interpretation of Non-Stress Test: Reactive

## 2025-01-11 LAB — TREPONEMA PALLIDUM IGG+IGM AB [PRESENCE] IN SERUM OR PLASMA BY IMMUNOASSAY: NONREACTIVE

## 2025-01-11 PROCEDURE — 3700000014 EPIDURAL BLOCK: Performed by: NURSE ANESTHETIST, CERTIFIED REGISTERED

## 2025-01-11 PROCEDURE — 2500000002 HC RX 250 W HCPCS SELF ADMINISTERED DRUGS (ALT 637 FOR MEDICARE OP, ALT 636 FOR OP/ED): Performed by: STUDENT IN AN ORGANIZED HEALTH CARE EDUCATION/TRAINING PROGRAM

## 2025-01-11 PROCEDURE — 1220000001 HC OB SEMI-PRIVATE ROOM DAILY

## 2025-01-11 PROCEDURE — 2500000004 HC RX 250 GENERAL PHARMACY W/ HCPCS (ALT 636 FOR OP/ED): Performed by: STUDENT IN AN ORGANIZED HEALTH CARE EDUCATION/TRAINING PROGRAM

## 2025-01-11 PROCEDURE — 0HQ9XZZ REPAIR PERINEUM SKIN, EXTERNAL APPROACH: ICD-10-PCS | Performed by: STUDENT IN AN ORGANIZED HEALTH CARE EDUCATION/TRAINING PROGRAM

## 2025-01-11 PROCEDURE — 7100000016 HC LABOR RECOVERY PER HOUR

## 2025-01-11 PROCEDURE — 10907ZC DRAINAGE OF AMNIOTIC FLUID, THERAPEUTIC FROM PRODUCTS OF CONCEPTION, VIA NATURAL OR ARTIFICIAL OPENING: ICD-10-PCS | Performed by: OBSTETRICS & GYNECOLOGY

## 2025-01-11 PROCEDURE — 0UQMXZZ REPAIR VULVA, EXTERNAL APPROACH: ICD-10-PCS | Performed by: STUDENT IN AN ORGANIZED HEALTH CARE EDUCATION/TRAINING PROGRAM

## 2025-01-11 PROCEDURE — 2500000001 HC RX 250 WO HCPCS SELF ADMINISTERED DRUGS (ALT 637 FOR MEDICARE OP): Performed by: STUDENT IN AN ORGANIZED HEALTH CARE EDUCATION/TRAINING PROGRAM

## 2025-01-11 PROCEDURE — 7210000002 HC LABOR PER HOUR

## 2025-01-11 PROCEDURE — 88307 TISSUE EXAM BY PATHOLOGIST: CPT | Mod: TC,STJLAB | Performed by: STUDENT IN AN ORGANIZED HEALTH CARE EDUCATION/TRAINING PROGRAM

## 2025-01-11 PROCEDURE — 2500000004 HC RX 250 GENERAL PHARMACY W/ HCPCS (ALT 636 FOR OP/ED): Performed by: NURSE ANESTHETIST, CERTIFIED REGISTERED

## 2025-01-11 PROCEDURE — 59409 OBSTETRICAL CARE: CPT

## 2025-01-11 RX ORDER — ACETAMINOPHEN 325 MG/1
975 TABLET ORAL EVERY 6 HOURS
Status: DISCONTINUED | OUTPATIENT
Start: 2025-01-11 | End: 2025-01-13 | Stop reason: HOSPADM

## 2025-01-11 RX ORDER — ONDANSETRON 4 MG/1
4 TABLET, FILM COATED ORAL EVERY 6 HOURS PRN
Status: DISCONTINUED | OUTPATIENT
Start: 2025-01-11 | End: 2025-01-13 | Stop reason: HOSPADM

## 2025-01-11 RX ORDER — DULOXETIN HYDROCHLORIDE 20 MG/1
20 CAPSULE, DELAYED RELEASE ORAL DAILY
Status: DISCONTINUED | OUTPATIENT
Start: 2025-01-12 | End: 2025-01-13 | Stop reason: HOSPADM

## 2025-01-11 RX ORDER — OXYTOCIN 10 [USP'U]/ML
10 INJECTION, SOLUTION INTRAMUSCULAR; INTRAVENOUS ONCE AS NEEDED
Status: DISCONTINUED | OUTPATIENT
Start: 2025-01-11 | End: 2025-01-13 | Stop reason: HOSPADM

## 2025-01-11 RX ORDER — POLYETHYLENE GLYCOL 3350 17 G/17G
17 POWDER, FOR SOLUTION ORAL 2 TIMES DAILY PRN
Status: DISCONTINUED | OUTPATIENT
Start: 2025-01-11 | End: 2025-01-13 | Stop reason: HOSPADM

## 2025-01-11 RX ORDER — IBUPROFEN 600 MG/1
600 TABLET ORAL EVERY 6 HOURS
Status: DISCONTINUED | OUTPATIENT
Start: 2025-01-11 | End: 2025-01-13 | Stop reason: HOSPADM

## 2025-01-11 RX ORDER — TRANEXAMIC ACID 100 MG/ML
1000 INJECTION, SOLUTION INTRAVENOUS ONCE AS NEEDED
Status: DISCONTINUED | OUTPATIENT
Start: 2025-01-11 | End: 2025-01-13 | Stop reason: HOSPADM

## 2025-01-11 RX ORDER — METHYLERGONOVINE MALEATE 0.2 MG/ML
0.2 INJECTION INTRAVENOUS ONCE AS NEEDED
Status: DISCONTINUED | OUTPATIENT
Start: 2025-01-11 | End: 2025-01-13 | Stop reason: HOSPADM

## 2025-01-11 RX ORDER — CARBOPROST TROMETHAMINE 250 UG/ML
250 INJECTION, SOLUTION INTRAMUSCULAR ONCE AS NEEDED
Status: DISCONTINUED | OUTPATIENT
Start: 2025-01-11 | End: 2025-01-13 | Stop reason: HOSPADM

## 2025-01-11 RX ORDER — LABETALOL HYDROCHLORIDE 5 MG/ML
20 INJECTION, SOLUTION INTRAVENOUS ONCE AS NEEDED
Status: DISCONTINUED | OUTPATIENT
Start: 2025-01-11 | End: 2025-01-13 | Stop reason: HOSPADM

## 2025-01-11 RX ORDER — FENTANYL CITRATE 50 UG/ML
INJECTION, SOLUTION INTRAMUSCULAR; INTRAVENOUS AS NEEDED
Status: DISCONTINUED | OUTPATIENT
Start: 2025-01-11 | End: 2025-01-11

## 2025-01-11 RX ORDER — HYDRALAZINE HYDROCHLORIDE 20 MG/ML
5 INJECTION INTRAMUSCULAR; INTRAVENOUS ONCE AS NEEDED
Status: DISCONTINUED | OUTPATIENT
Start: 2025-01-11 | End: 2025-01-13 | Stop reason: HOSPADM

## 2025-01-11 RX ORDER — BISACODYL 10 MG/1
10 SUPPOSITORY RECTAL DAILY PRN
Status: DISCONTINUED | OUTPATIENT
Start: 2025-01-11 | End: 2025-01-13 | Stop reason: HOSPADM

## 2025-01-11 RX ORDER — SIMETHICONE 80 MG
80 TABLET,CHEWABLE ORAL 4 TIMES DAILY PRN
Status: DISCONTINUED | OUTPATIENT
Start: 2025-01-11 | End: 2025-01-13 | Stop reason: HOSPADM

## 2025-01-11 RX ORDER — NIFEDIPINE 10 MG/1
10 CAPSULE ORAL ONCE AS NEEDED
Status: DISCONTINUED | OUTPATIENT
Start: 2025-01-11 | End: 2025-01-13 | Stop reason: HOSPADM

## 2025-01-11 RX ORDER — ADHESIVE BANDAGE
10 BANDAGE TOPICAL
Status: DISCONTINUED | OUTPATIENT
Start: 2025-01-11 | End: 2025-01-13 | Stop reason: HOSPADM

## 2025-01-11 RX ORDER — MISOPROSTOL 200 UG/1
800 TABLET ORAL ONCE AS NEEDED
Status: DISCONTINUED | OUTPATIENT
Start: 2025-01-11 | End: 2025-01-13 | Stop reason: HOSPADM

## 2025-01-11 RX ORDER — LOPERAMIDE HYDROCHLORIDE 2 MG/1
4 CAPSULE ORAL EVERY 2 HOUR PRN
Status: DISCONTINUED | OUTPATIENT
Start: 2025-01-11 | End: 2025-01-13 | Stop reason: HOSPADM

## 2025-01-11 RX ORDER — NIFEDIPINE 60 MG/1
60 TABLET, FILM COATED, EXTENDED RELEASE ORAL
Status: DISCONTINUED | OUTPATIENT
Start: 2025-01-11 | End: 2025-01-13 | Stop reason: HOSPADM

## 2025-01-11 RX ORDER — ENOXAPARIN SODIUM 100 MG/ML
40 INJECTION SUBCUTANEOUS EVERY 24 HOURS
Status: DISCONTINUED | OUTPATIENT
Start: 2025-01-11 | End: 2025-01-13 | Stop reason: HOSPADM

## 2025-01-11 RX ORDER — LIDOCAINE 560 MG/1
1 PATCH PERCUTANEOUS; TOPICAL; TRANSDERMAL
Status: DISCONTINUED | OUTPATIENT
Start: 2025-01-11 | End: 2025-01-13 | Stop reason: HOSPADM

## 2025-01-11 RX ORDER — DIPHENHYDRAMINE HYDROCHLORIDE 50 MG/ML
25 INJECTION INTRAMUSCULAR; INTRAVENOUS EVERY 6 HOURS PRN
Status: DISCONTINUED | OUTPATIENT
Start: 2025-01-11 | End: 2025-01-13 | Stop reason: HOSPADM

## 2025-01-11 RX ORDER — DIPHENHYDRAMINE HCL 25 MG
25 TABLET ORAL EVERY 6 HOURS PRN
Status: DISCONTINUED | OUTPATIENT
Start: 2025-01-11 | End: 2025-01-13 | Stop reason: HOSPADM

## 2025-01-11 RX ORDER — OXYTOCIN/0.9 % SODIUM CHLORIDE 30/500 ML
60 PLASTIC BAG, INJECTION (ML) INTRAVENOUS ONCE AS NEEDED
Status: DISCONTINUED | OUTPATIENT
Start: 2025-01-11 | End: 2025-01-13 | Stop reason: HOSPADM

## 2025-01-11 RX ORDER — ONDANSETRON HYDROCHLORIDE 2 MG/ML
4 INJECTION, SOLUTION INTRAVENOUS EVERY 6 HOURS PRN
Status: DISCONTINUED | OUTPATIENT
Start: 2025-01-11 | End: 2025-01-13 | Stop reason: HOSPADM

## 2025-01-11 RX ADMIN — IBUPROFEN 600 MG: 600 TABLET, FILM COATED ORAL at 20:36

## 2025-01-11 RX ADMIN — ACETAMINOPHEN 975 MG: 325 TABLET ORAL at 20:36

## 2025-01-11 RX ADMIN — IBUPROFEN 600 MG: 600 TABLET, FILM COATED ORAL at 14:12

## 2025-01-11 RX ADMIN — NIFEDIPINE 60 MG: 60 TABLET, FILM COATED, EXTENDED RELEASE ORAL at 09:26

## 2025-01-11 RX ADMIN — Medication 9 ML/HR: at 00:24

## 2025-01-11 RX ADMIN — ACETAMINOPHEN 975 MG: 325 TABLET ORAL at 08:05

## 2025-01-11 RX ADMIN — IBUPROFEN 600 MG: 600 TABLET, FILM COATED ORAL at 08:06

## 2025-01-11 RX ADMIN — BENZOCAINE AND LEVOMENTHOL 1 APPLICATION: 200; 5 SPRAY TOPICAL at 09:26

## 2025-01-11 RX ADMIN — ACETAMINOPHEN 975 MG: 325 TABLET ORAL at 14:12

## 2025-01-11 RX ADMIN — FENTANYL CITRATE 100 MCG: 50 INJECTION, SOLUTION INTRAMUSCULAR; INTRAVENOUS at 04:45

## 2025-01-11 RX ADMIN — ENOXAPARIN SODIUM 40 MG: 40 INJECTION SUBCUTANEOUS at 20:38

## 2025-01-11 ASSESSMENT — PAIN SCALES - GENERAL
PAINLEVEL_OUTOF10: 0 - NO PAIN
PAINLEVEL_OUTOF10: 3
PAINLEVEL_OUTOF10: 0 - NO PAIN
PAINLEVEL_OUTOF10: 8
PAINLEVEL_OUTOF10: 0 - NO PAIN
PAINLEVEL_OUTOF10: 3
PAINLEVEL_OUTOF10: 2
PAINLEVEL_OUTOF10: 3
PAINLEVEL_OUTOF10: 6
PAINLEVEL_OUTOF10: 0 - NO PAIN
PAIN_LEVEL: 0
PAINLEVEL_OUTOF10: 3
PAINLEVEL_OUTOF10: 0 - NO PAIN

## 2025-01-11 ASSESSMENT — PAIN DESCRIPTION - DESCRIPTORS: DESCRIPTORS: DISCOMFORT

## 2025-01-11 ASSESSMENT — PAIN - FUNCTIONAL ASSESSMENT: PAIN_FUNCTIONAL_ASSESSMENT: 0-10

## 2025-01-11 NOTE — ANESTHESIA PROCEDURE NOTES
Epidural Block    Patient location during procedure: OB  Start time: 1/11/2025 12:05 AM  End time: 1/11/2025 12:24 AM  Reason for block: labor analgesia  Staffing  Performed: CRNA   Authorized by: OLAMIDE Dempsey    Performed by: OLAMIDE Dempsey    Preanesthetic Checklist  Completed: patient identified, IV checked, risks and benefits discussed, surgical consent, pre-op evaluation, timeout performed and sterile techniques followed  Block Timeout  RN/Licensed healthcare professional reads aloud to the Anesthesia provider and entire team: Patient identity, procedure with side and site, patient position, and as applicable the availability of implants/special equipment/special requirements.  Patient on coagulant treatment: no  Timeout performed at: 1/11/2025 12:05 AM  Block Placement  Patient position: sitting  Prep: ChloraPrep  Sterility prep: mask, hand, gloves, drape and cap  Sedation level: no sedation  Patient monitoring: heart rate, continuous pulse oximetry and blood pressure  Approach: midline  Local numbing: lidocaine 1% to skin and subcutaneous tissues  Vertebral space: lumbar  Lumbar location: L3-L4  Epidural  Loss of resistance technique: saline  Guidance: landmark technique        Needle  Needle type: Tuohy   Needle gauge: 17  Needle length: 8.9cm  Needle insertion depth: 7 cm  Catheter type: multi-orifice  Catheter size: 20 G  Catheter at skin depth: 12 cm  Catheter securement method: clear occlusive dressing and surgical tape    Test dose: lidocaine 1.5% with epinephrine 1-to-200,000  Test dose: lidocaine 1.5% with epinephrine 1-to-200,000  Test dose result: no positive test dose    PCEA  Medication concentration used: 0.2% Ropivacaine with 2 mcg/mL Fentanyl  Dose (mL): 5  Lockout (minutes): 20  1-Hour Limit (boluses/hr): 3  Basal Rate: 9        Assessment  Sensory level: T6 bilateral  Block outcome: pain improved  Number of attempts: 1  Events: no positive test dose  Procedure  assessment: patient tolerated procedure well with no immediate complications

## 2025-01-11 NOTE — NURSING NOTE
2008 - PT off monitor and up to RR   2148 - Pt off monitor and up to RR   2306 - Pt off monitor and up to RR   0000 - pt sitting for epidural   0314 - Pt repositioned to far left side

## 2025-01-11 NOTE — ANESTHESIA PREPROCEDURE EVALUATION
Patient: Tamera Isaac    Evaluation Method: In-person visit    Procedure Information    Date: 01/10/25  Procedure: Labor Analgesia         Relevant Problems   Cardiac   (+) Pre-existing hypertension during pregnancy in first trimester ([facility])      Neuro   (+) Anxiety      Hematology   (+) Anemia affecting pregnancy in third trimester ([facility])      GYN   (+) 36 weeks gestation of pregnancy ([facility])   (+) High risk multigravida in third trimester ([facility])       Clinical information reviewed:   Tobacco  Allergies  Meds   Med Hx  Surg Hx   Fam Hx          NPO Detail:  No data recorded     OB/Gyn Evaluation    Present Pregnancy    Patient is pregnant now.   Obstetric History                Physical Exam    Airway  Mallampati: II  TM distance: >3 FB     Cardiovascular - normal exam     Dental    Pulmonary    Abdominal            Anesthesia Plan    History of general anesthesia?: no  History of complications of general anesthesia?: unknown/emergency    ASA 2     epidural   (I informed and discussed the risks and benefits of general, spinal and epidural anesthesia with the patient.  The patient expressed her understanding and her questions were answered.  A verbal consent was given by the patient.  )  The patient is not a current smoker.    Anesthetic plan and risks discussed with patient.  Use of blood products discussed with patient who consented to blood products.

## 2025-01-11 NOTE — PROGRESS NOTES
"Intrapartum Progress Note    Assessment/Plan   Tamera Isaac is a 34 y.o.  at 37w5d. DIONNE: 2025, by Ultrasound admitted for induction of labor in the setting of chronic hypertension.    - Continue pitocin per protocol  - Will plan for AROM at next exam, pending fetal station    Assessment & Plan    Pregnancy Problems (from 24 to present)       Problem Noted Diagnosed Resolved    Anemia affecting pregnancy in third trimester ([facility]) 2024 by IRVING Fisher  No    Priority:  Medium       Overview Addendum 2024  5:22 PM by Carla Palma MD     - taking oral iron          High risk multigravida in third trimester ([facility]) 2024 by IRVING Fisher  No    Priority:  Medium       36 weeks gestation of pregnancy ([facility]) 2024 by Carla Palma MD  No    Priority:  Medium       Overview Addendum 1/10/2025  2:24 PM by Carla Palma MD     Desired provider in labor: [x] CNM  [] Physician  [x] Blood Products: [x] Yes, accepts [] No, needs counseling  [x] Initial BMI: 23.92   [x] Prenatal Labs:  UTD  [x] Cervical Cancer Screening up to date-  normal pap  - plan postpartum pap   [x] Rh status: positive   [x] Genetic Screening:  RR NIPS - female - \"Mcwilliams\"  [x] NT US: (11-13 wks) WNL  [x] Baby ASA (if indicated): taking   [x] Pregnancy dated by: 13 week USN     [x] Anatomy US: (19-20 wks): WNL   [x] 1hr GCT at 24-28wks: WNL   [x] Fetal Surveillance (if indicated):<> NSTs at 32 weeks   [x] Tdap: given    [x] RSV: given    [x] Flu Vaccine:  given at work   [x] Updated COVID vaccine recommended     [x] Breastfeeding: pump form submitted 10/18    [] Postpartum Birth control method:   [x] GBS at 36 - 37 wks: negative   [x] 39 weeks discussion of IOL vs. Expectant management: IOL scheduled 1/15 at 8am    [x] Mode of delivery ( anticipated ):          Pre-existing hypertension during pregnancy in first trimester ([facility]) " 6/14/2024 by Carla Palma MD  No    Priority:  Medium       Overview Addendum 1/7/2025 10:31 AM by Carla Palma MD     - on nifedipine XL 60mg daily   - on ASA prophylaxis   - normal Baseline preeclampsia labs   - normal growth 28 weeks  - USN 36 weeks - EFW 73%  <> weekly NSTs     - IOL scheduled 1/15 at 8am                  Subjective   Doing well, no complaints. Comfortable with contractions at this time, not yet ready for epidural.    Objective   Last Vitals:  Temp Pulse Resp BP MAP Pulse Ox   36.4 °C (97.5 °F) 82 14 (!) 141/87 109 99 %     Vitals Min/Max Last 24 Hours:  Temp  Min: 36.4 °C (97.5 °F)  Max: 37 °C (98.6 °F)  Pulse  Min: 82  Max: 90  Resp  Min: 14  Max: 17  BP  Min: 134/87  Max: 158/104  MAP (mmHg)  Min: 102  Max: 111    Intake/Output:  No intake or output data in the 24 hours ending 01/10/25 2307    Physical Examination:  GENERAL: Examination reveals a well developed, well nourished, gravid female in no acute distress. She is alert and cooperative.  LUNGS:  no increased work of breathing  HEART:  warm and well perfused  ABDOMEN: soft, gravid, nontender, nondistended, no abnormal masses, no epigastric pain  FHR is 140 BPM, with Accelerations, and a   tracing.    [place] reading:    CERVIX: 4-5 cm dilated, 50 % effaced, -2 station; MEMBRANES are Intact  EXTREMITIES: no redness or tenderness in the calves or thighs, edema trace+  NEUROLOGICAL:  no gross deficits  PSYCHOLOGICAL: awake and alert; oriented to person, place, and time    Lab Review:  Labs in chart were reviewed.

## 2025-01-11 NOTE — CARE PLAN
The patient's goals for the shift include      The clinical goals for the shift include vitals wdl    Problem: Vaginal Birth or  Section  Goal: Fetal and maternal status remain reassuring during the birth process  Outcome: Progressing  Flowsheets (Taken 1/10/2025 2016)  Fetal and maternal status remain reassuring during the birth process:   Monitor vital signs   Monitor fetal heart rate     Problem: Safety - Adult  Goal: Free from fall injury  Outcome: Progressing  Flowsheets (Taken 1/10/2025 2016)  Free from fall injury: Instruct family/caregiver on patient safety     Problem: Discharge Planning  Goal: Discharge to home or other facility with appropriate resources  Outcome: Progressing  Flowsheets (Taken 1/10/2025 2016)  Discharge to home or other facility with appropriate resources: Identify barriers to discharge with patient and caregiver

## 2025-01-11 NOTE — PROGRESS NOTES
"Intrapartum Progress Note    Assessment/Plan   Tamera Isaac is a 34 y.o.  at 37w6d. DIONNE: 2025, by Ultrasound admitted for induction of labor in the setting of chronic hypertension.     - s/p amniotomy  - continue pitocin per protocol  - anticipate     Assessment & Plan    Pregnancy Problems (from 24 to present)       Problem Noted Diagnosed Resolved    Anemia affecting pregnancy in third trimester ([facility]) 2024 by IRVING Fisher  No    Priority:  Medium       Overview Addendum 2024  5:22 PM by Carla Palma MD     - taking oral iron          High risk multigravida in third trimester ([facility]) 2024 by IRVING Fisher  No    Priority:  Medium       36 weeks gestation of pregnancy ([facility]) 2024 by Carla Palma MD  No    Priority:  Medium       Overview Addendum 1/10/2025  2:24 PM by Carla Palma MD     Desired provider in labor: [x] CNM  [] Physician  [x] Blood Products: [x] Yes, accepts [] No, needs counseling  [x] Initial BMI: 23.92   [x] Prenatal Labs:  UTD  [x] Cervical Cancer Screening up to date-  normal pap  - plan postpartum pap   [x] Rh status: positive   [x] Genetic Screening:  RR NIPS - female - \"Mcwilliams\"  [x] NT US: (11-13 wks) WNL  [x] Baby ASA (if indicated): taking   [x] Pregnancy dated by: 13 week USN     [x] Anatomy US: (19-20 wks): WNL   [x] 1hr GCT at 24-28wks: WNL   [x] Fetal Surveillance (if indicated):<> NSTs at 32 weeks   [x] Tdap: given    [x] RSV: given    [x] Flu Vaccine:  given at work   [x] Updated COVID vaccine recommended     [x] Breastfeeding: pump form submitted 10/18    [] Postpartum Birth control method:   [x] GBS at 36 - 37 wks: negative   [x] 39 weeks discussion of IOL vs. Expectant management: IOL scheduled 1/15 at 8am    [x] Mode of delivery ( anticipated ):          Pre-existing hypertension during pregnancy in first trimester ([facility]) 2024 by Carla TAVARES" MD Minerva  No    Priority:  Medium       Overview Addendum 1/7/2025 10:31 AM by Carla Palma MD     - on nifedipine XL 60mg daily   - on ASA prophylaxis   - normal Baseline preeclampsia labs   - normal growth 28 weeks  - USN 36 weeks - EFW 73%  <> weekly NSTs     - IOL scheduled 1/15 at 8am                  Subjective   Patient without complaints, comfortable with epidural.    Objective   Last Vitals:  Temp Pulse Resp BP MAP Pulse Ox   36.6 °C (97.9 °F) 85 18 123/75 94 100 %     Vitals Min/Max Last 24 Hours:  Temp  Min: 36.4 °C (97.5 °F)  Max: 37 °C (98.6 °F)  Pulse  Min: 69  Max: 99  Resp  Min: 14  Max: 18  BP  Min: 114/79  Max: 158/104  MAP (mmHg)  Min: 91  Max: 111    Intake/Output:    Intake/Output Summary (Last 24 hours) at 1/11/2025 0302  Last data filed at 1/11/2025 0130  Gross per 24 hour   Intake 15.75 ml   Output --   Net 15.75 ml       Physical Examination:  GENERAL: Examination reveals a well developed, well nourished, gravid female in no acute distress. She is alert and cooperative.  LUNGS:  no increased work of breathing  HEART:  warm and well perfused  ABDOMEN: soft, gravid, nontender, nondistended, no abnormal masses, no epigastric pain  FHR is 140 BPM, with Accelerations, and a   tracing.    [place] reading:    CERVIX: 5 cm dilated, 70 % effaced, -2 station; amniotomy performed, clear fluid  EXTREMITIES: no redness or tenderness in the calves or thighs, edema trace+  NEUROLOGICAL:  no gross deficits  PSYCHOLOGICAL: awake and alert; oriented to person, place, and time    Lab Review:  Labs in chart were reviewed.

## 2025-01-11 NOTE — H&P
" OB Admission H&P    Assessment/Plan    Tamera Isaac is a 34 y.o.  at 37w5d, DIONNE: 2025, by Ultrasound, who is admitted for induction of Labor for chronic hypertension.    Plan   -Admit to L&D, consented  -T&S, CBC, and Syphilis  -Epidural at patient request  -Recheck as clinically indicated by maternal or fetal status    Fetal Status  -NST reactive, reassuring   -Presentation vertex based on Leopold's maneuver  -GBS Negative    Postpartum  Contraception Plan:  not available at [facility]    Pregnancy Problems (from 24 to present)       Problem Noted Diagnosed Resolved    Anemia affecting pregnancy in third trimester ([facility]) 2024 by IRVING Fisher  No    Priority:  Medium       Overview Addendum 2024  5:22 PM by Carla Palma MD     - taking oral iron          High risk multigravida in third trimester ([facility]) 2024 by IRVING Fisher  No    Priority:  Medium       36 weeks gestation of pregnancy ([facility]) 2024 by Carla Palma MD  No    Priority:  Medium       Overview Addendum 1/10/2025  2:24 PM by Carla Palma MD     Desired provider in labor: [x] CNM  [] Physician  [x] Blood Products: [x] Yes, accepts [] No, needs counseling  [x] Initial BMI: 23.92   [x] Prenatal Labs:  UTD  [x] Cervical Cancer Screening up to date-  normal pap  - plan postpartum pap   [x] Rh status: positive   [x] Genetic Screening:  RR NIPS - female - \"Mcwilliams\"  [x] NT US: (11-13 wks) WNL  [x] Baby ASA (if indicated): taking   [x] Pregnancy dated by: 13 week USN     [x] Anatomy US: (19-20 wks): WNL   [x] 1hr GCT at 24-28wks: WNL   [x] Fetal Surveillance (if indicated):<> NSTs at 32 weeks   [x] Tdap: given    [x] RSV: given    [x] Flu Vaccine:  given at work   [x] Updated COVID vaccine recommended     [x] Breastfeeding: pump form submitted 10/18    [] Postpartum Birth control method:   [x] GBS at 36 - 37 wks: negative   [x] 39 weeks " discussion of IOL vs. Expectant management: IOL scheduled 1/15 at 8am    [x] Mode of delivery ( anticipated ):          Pre-existing hypertension during pregnancy in first trimester ([facility]) 2024 by Carla Palma MD  No    Priority:  Medium       Overview Addendum 2025 10:31 AM by Carla Palma MD     - on nifedipine XL 60mg daily   - on ASA prophylaxis   - normal Baseline preeclampsia labs   - normal growth 28 weeks  - USN 36 weeks - EFW 73%  <> weekly NSTs     - IOL scheduled 1/15 at 8am                  Subjective   Good fetal movement.  Denies vaginal bleeding., Denies contractions., Denies leaking of fluid.      Prenatal Provider Minerva    OB History    Para Term  AB Living   3 1 1 0 1 1   SAB IAB Ectopic Multiple Live Births   0 1 0 0 1      # Outcome Date GA Lbr Gabo/2nd Weight Sex Type Anes PTL Lv   3 Current            2 Term 12/10/22 38w0d  2.785 kg M Vag-Vacuum None N INESSA      Complications: Chronic hypertension affecting pregnancy ([facility])      Name: Mark   1 IAB                Past Surgical History:   Procedure Laterality Date    OTHER SURGICAL HISTORY  2022    Surgically induced        Social History     Tobacco Use    Smoking status: Never     Passive exposure: Never    Smokeless tobacco: Never   Substance Use Topics    Alcohol use: Not Currently     Comment: 1 per month       No Known Allergies    Medications Prior to Admission   Medication Sig Dispense Refill Last Dose/Taking    DULoxetine (Cymbalta) 20 mg DR capsule Take 1 capsule (20 mg) by mouth once daily. 90 capsule 3     famotidine (Pepcid) 20 mg tablet Take 1 tablet (20 mg) by mouth once daily at bedtime. 30 tablet 5     ferrous sulfate 325 (65 Fe) MG EC tablet Take 65 mg by mouth once daily with breakfast. Do not crush, chew, or split.       NIFEdipine ER (Adalat CC) 60 mg 24 hr tablet Take 1 tablet by mouth every day as directed 330 tablet 0     pantoprazole (Protonix) 40 mg EC  tablet Take 1 tablet (40 mg) by mouth once daily in the morning. Take before meals. Do not crush, chew, or split. 30 tablet 3     prenatal no115/iron/folic acid (PRENATAL 19 ORAL) Take 1 tablet by mouth early in the morning..        Objective     Last Vitals  Temp Pulse Resp BP MAP O2 Sat   36.6 °C (97.9 °F) 90 17 (!) 144/89 111 99 %     Blood Pressures         1/10/2025  1705             BP: 144/89             Physical Exam  General: NAD, mood appropriate  Cardiopulmonary: warm and well perfused, breathing comfortably on room air  Abdomen: Gravid, non-tender  Extremities: Symmetric  Speculum Exam: deferred  Cervix: 2 /50 /-2      Fetal Monitoring  Baseline: 140 bpm, Variability: moderate,  Accelerations: present and Decelerations: none  Uterine Activity: No contractions seen on toco  Interpretation: Reactive        Labs in chart were reviewed.   Results from last 7 days   Lab Units 01/10/25  1718   WBC AUTO x10*3/uL 7.8   HEMOGLOBIN g/dL 11.1*   HEMATOCRIT % 32.6*   PLATELETS AUTO x10*3/uL 168   AST U/L 17   ALT U/L 10   CREATININE mg/dL 0.60        Prenatal labs reviewed, not remarkable.

## 2025-01-11 NOTE — LACTATION NOTE
Lactation Consultant Note  LC bedside to do consult, infant asleep. Mother stated she tried to latch infant at 1100, unable to latch because infant was too sleepy. Hand expressed and spoon fed for that feeding. Instructed to press call light and ask for lactation for next feed to assist with latching.

## 2025-01-11 NOTE — CARE PLAN
The patient's goals for the shift include bond with baby    The clinical goals for the shift include BP remain wnl    Over the shift, the patient did make progress toward the following goals.

## 2025-01-11 NOTE — L&D DELIVERY NOTE
OB Delivery Note  2025  Tamera Isaac  34 y.o.   Vaginal, Spontaneous     Patient began pushing and delivered a live female  in the RAFAELA position.  placed on maternal abdomen.  Noted to have brisk bleeding from right vaginal laceration at hymen, sutured with 3-0 Vicryl, figure of eight stitch. Cord was then clamped and cut. Placenta delivered and intact. Fundal massage performed and fundus firm below the umbilicus. Noted to have 1st degree perineal and left vaginal lacerations that were each repaired with a figure of eight stitch using 3-0 Vicryl.  ml.      Gestational Age: 37w6d  /Para:   Quantitative Blood Loss: Admission to Discharge: 0 mL (1/10/2025  4:38 PM - 2025  5:05 AM)    Yariel Isaac [[id number]]      Labor Events    Rupture date/time: 2025 0310  Rupture type: Artificial  Fluid color: Clear  Fluid odor: None  Complications: None       Placenta    Placenta delivery date/time:   Placenta removal: Spontaneous  Placenta appearance: Intact       Cord    Vessels: 3 vessels  Complications: None  Delayed cord clamping?: Yes       Lacerations    Episiotomy: None  Perineal laceration: 1st  Perineal laceration repaired?: Yes  Vaginal laceration?: Yes  Vaginal laceration location: bilateral  Vaginal laceration repaired?: Yes  Repair suture: 3-0 Synthetic Suture       Anesthesia    Method: Epidural       Operative Delivery    Forceps attempted?: No  Vacuum extractor attempted?: No       Shoulder Dystocia    Shoulder dystocia present?: No       [place] Delivery    Birth date/time: 2025 04:51:00  Delivery type: Vaginal, Spontaneous  Complications: None       Apgars    Living status: Living  Apgar Component Scores:  1 min.:  5 min.:  10 min.:  15 min.:  20 min.:    Skin color:         Heart rate:         Reflex irritability:         Muscle tone:         Respiratory effort:         Total:                Delivery Providers    Delivering clinician:    Provider  Role     Delivery Nurse     Nursery Nurse     Resident                 Intrauterine Device Inserted : No, unavailable at [facility]    Chloe Rodrigues MD

## 2025-01-11 NOTE — LACTATION NOTE
Lactation Consultant Note  Lactation Consultation  Reason for Consult: Initial assessment  Consultant Name: Be RN, IBCLC    Maternal Information  Has mother  before?: Yes  How long did the mother previously breastfeed?: currently still breastfeeding 2 year old  Infant to breast within first 2 hours of birth?: Yes    Maternal Assessment  Breast Assessment: Large, Compressible, Soft  Nipple Assessment: Intact, Erect  Areola Assessment: Normal    Infant Assessment  Infant Behavior: Sleepy  Infant Assessment: Tongue protrudes over alveolar ridge    Feeding Assessment  Nutrition Source: Breastmilk  Feeding Method: Nursing at the breast  Feeding Position: Baby led, Cross - cradle, Breast sandwich, Mother needs assistance with latch/positioning  Suck/Feeding: Other (Comment) (no latch achieved)  Latch Assessment: No latch achieved    LATCH TOOL  Latch: Too sleepy or reluctant, no latch achieved  Audible Swallowing: None  Type of Nipple: Everted (After stimulation)  Comfort (Breast/Nipple): Soft/non-tender  Hold (Positioning): Minimal assist, teach one side, mother does other, staff holds  LATCH Score: 5    Breast Pump  Pump: Hand expression  Frequency: Other (comment) (as needed when infant is too sleepy to latch)    Other OB Lactation Tools       Patient Follow-up  Inpatient Lactation Follow-up Needed : Yes  Outpatient Lactation Follow-up: Recommended    Other OB Lactation Documentation  Maternal Risk Factors: Hypertension  Infant Risk Factors: Early term birth 37-39 weeks, Poor or painful latch / restricted feedings    Recommendations/Summary  Patient is a 34 year old, , 37.6 week vaginal delivery on 2025 at 0451. Infant with birth weight of 3280g. Mother is still currently breastfeeding 2 year old. Mother reports she has been hand expressing and spoon feeding because infant is too sleepy to latch. Attempted a latch at this feeding. Taught ABC's of good positioning: arms around breast, infant belly  to belly with parent, infant's curve of hip to parent's curve of body. Taught to have infant rest their chin on the breast below the areola. Parents instructed to wait for gape reflex elicited by chin pressure on the breast, before hugging infant close to facilitate latching. Parents demonstrate technique with minimal assistance from IBCLC to time latching. Infant too sleepy at breast, no latch achieved. Reviewed waking techniques with family. Mother hand expressed and spoon fed infant for this feed.       Feeding Plan  Cue based feeding, at least 8-12 times in 24 hours  Frequent skin to skin  Hand express for extra volume  Call for assistance as needed

## 2025-01-12 PROCEDURE — 2500000004 HC RX 250 GENERAL PHARMACY W/ HCPCS (ALT 636 FOR OP/ED): Performed by: STUDENT IN AN ORGANIZED HEALTH CARE EDUCATION/TRAINING PROGRAM

## 2025-01-12 PROCEDURE — 2500000002 HC RX 250 W HCPCS SELF ADMINISTERED DRUGS (ALT 637 FOR MEDICARE OP, ALT 636 FOR OP/ED): Performed by: STUDENT IN AN ORGANIZED HEALTH CARE EDUCATION/TRAINING PROGRAM

## 2025-01-12 PROCEDURE — 2500000001 HC RX 250 WO HCPCS SELF ADMINISTERED DRUGS (ALT 637 FOR MEDICARE OP): Performed by: STUDENT IN AN ORGANIZED HEALTH CARE EDUCATION/TRAINING PROGRAM

## 2025-01-12 PROCEDURE — 1220000001 HC OB SEMI-PRIVATE ROOM DAILY

## 2025-01-12 PROCEDURE — 2500000001 HC RX 250 WO HCPCS SELF ADMINISTERED DRUGS (ALT 637 FOR MEDICARE OP): Performed by: OBSTETRICS & GYNECOLOGY

## 2025-01-12 RX ADMIN — ACETAMINOPHEN 975 MG: 325 TABLET ORAL at 08:00

## 2025-01-12 RX ADMIN — ACETAMINOPHEN 975 MG: 325 TABLET ORAL at 20:28

## 2025-01-12 RX ADMIN — ENOXAPARIN SODIUM 40 MG: 40 INJECTION SUBCUTANEOUS at 20:29

## 2025-01-12 RX ADMIN — IBUPROFEN 600 MG: 600 TABLET, FILM COATED ORAL at 20:28

## 2025-01-12 RX ADMIN — NIFEDIPINE 60 MG: 60 TABLET, FILM COATED, EXTENDED RELEASE ORAL at 06:31

## 2025-01-12 RX ADMIN — ACETAMINOPHEN 975 MG: 325 TABLET ORAL at 14:45

## 2025-01-12 RX ADMIN — IBUPROFEN 600 MG: 600 TABLET, FILM COATED ORAL at 14:45

## 2025-01-12 RX ADMIN — ACETAMINOPHEN 975 MG: 325 TABLET ORAL at 02:31

## 2025-01-12 RX ADMIN — IBUPROFEN 600 MG: 600 TABLET, FILM COATED ORAL at 08:00

## 2025-01-12 RX ADMIN — DULOXETINE HYDROCHLORIDE 20 MG: 20 CAPSULE, DELAYED RELEASE ORAL at 08:00

## 2025-01-12 RX ADMIN — IBUPROFEN 600 MG: 600 TABLET, FILM COATED ORAL at 02:31

## 2025-01-12 ASSESSMENT — PAIN SCALES - GENERAL
PAINLEVEL_OUTOF10: 1
PAINLEVEL_OUTOF10: 0 - NO PAIN
PAINLEVEL_OUTOF10: 2
PAINLEVEL_OUTOF10: 2

## 2025-01-12 ASSESSMENT — PAIN DESCRIPTION - LOCATION: LOCATION: ABDOMEN

## 2025-01-12 NOTE — PROGRESS NOTES
"Postpartum Progress Note    Assessment/Plan   Tamera Isaac is a 34 y.o., , who delivered at 37w6d gestation and is now postpartum day 1.    VSS  Bleeding minimal  Not having pain  Talked about possible DC home today and PP precautions.  Will DC if baby is DC'd    Assessment & Plan    Pregnancy Problems (from 24 to present)       Problem Noted Diagnosed Resolved    Anemia affecting pregnancy in third trimester ([facility]) 2024 by IRVING Fisher  No    Priority:  Medium       Overview Addendum 2024  5:22 PM by Carla Palma MD     - taking oral iron          High risk multigravida in third trimester ([facility]) 2024 by IRVING Fisher  No    Priority:  Medium       36 weeks gestation of pregnancy ([facility]) 2024 by Carla Palma MD  No    Priority:  Medium       Overview Addendum 1/10/2025  2:24 PM by Carla Palma MD     Desired provider in labor: [x] CNM  [] Physician  [x] Blood Products: [x] Yes, accepts [] No, needs counseling  [x] Initial BMI: 23.92   [x] Prenatal Labs:  UTD  [x] Cervical Cancer Screening up to date-  normal pap  - plan postpartum pap   [x] Rh status: positive   [x] Genetic Screening:  RR NIPS - female - \"Mcwilliams\"  [x] NT US: (11-13 wks) WNL  [x] Baby ASA (if indicated): taking   [x] Pregnancy dated by: 13 week USN     [x] Anatomy US: (19-20 wks): WNL   [x] 1hr GCT at 24-28wks: WNL   [x] Fetal Surveillance (if indicated):<> NSTs at 32 weeks   [x] Tdap: given    [x] RSV: given    [x] Flu Vaccine:  given at work   [x] Updated COVID vaccine recommended     [x] Breastfeeding: pump form submitted 10/18    [] Postpartum Birth control method:   [x] GBS at 36 - 37 wks: negative   [x] 39 weeks discussion of IOL vs. Expectant management: IOL scheduled 1/15 at 8am    [x] Mode of delivery ( anticipated ):          Pre-existing hypertension during pregnancy in first trimester ([facility]) 2024 by " Carla Palma MD  No    Priority:  Medium       Overview Addendum 1/7/2025 10:31 AM by Carla Palma MD     - on nifedipine XL 60mg daily   - on ASA prophylaxis   - normal Baseline preeclampsia labs   - normal growth 28 weeks  - USN 36 weeks - EFW 73%  <> weekly NSTs     - IOL scheduled 1/15 at 8am                Hospital course: chronic hypertension  Vaginal Birth  Patient is currently breastfeedingThe patient's blood type is O POS. The baby's blood type is B NEG. Rhogam is not indicated.    Subjective   Her pain is well controlled with current medications  She is passing flatus  She is ambulating well  She is tolerating a Adult diet Regular  She reports no breast or nursing problems  She denies emotional concerns today   Her plan for contraception is none     Pt PP day #1 vaginal delivery, doing well, bleeding minimal, not having much bleeding, no other problems.  Pt would like to go home today if possible.    Objective   Allergies:   Patient has no known allergies.         Last Vitals:  Temp Pulse Resp BP MAP Pulse Ox   36.8 °C (98.2 °F) 82 16 132/78 91 99 %     Vitals Min/Max Last 24 Hours:  Temp  Min: 36.4 °C (97.6 °F)  Max: 36.8 °C (98.2 °F)  Pulse  Min: 69  Max: 82  Resp  Min: 15  Max: 18  BP  Min: 118/78  Max: 149/93  MAP (mmHg)  Min: 91  Max: 106    Intake/Output:     Intake/Output Summary (Last 24 hours) at 1/12/2025 1034  Last data filed at 1/11/2025 1300  Gross per 24 hour   Intake --   Output 800 ml   Net -800 ml       Physical Exam:  General: Examination reveals a well developed, well nourished, female, in no acute distress. She is alert and cooperative.  Fundus: firm and nontender.  Extremities: no redness or tenderness in the calves or thighs, no edema.    Lab Data:

## 2025-01-12 NOTE — ANESTHESIA POSTPROCEDURE EVALUATION
Patient: Tamera Isaac    Procedure Summary       Date: 01/11/25 Room / Location:     Anesthesia Start: 0005 Anesthesia Stop: 0451    Procedure: Labor Analgesia Diagnosis:     Scheduled Providers:  Responsible Provider: OLAMIDE Dempsey    Anesthesia Type: epidural ASA Status: 2            Anesthesia Type: epidural      /93   Pulse 69   Temp 36.5 °C (97.7 °F) (Oral)   Resp 16   SpO2 99%        Anesthesia Post Evaluation    Patient location during evaluation: bedside  Patient participation: complete - patient participated  Level of consciousness: awake and alert  Pain score: 0  Pain management: satisfactory to patient  Multimodal analgesia pain management approach  Airway patency: patent  Cardiovascular status: acceptable  Respiratory status: acceptable  Hydration status: acceptable  Postoperative Nausea and Vomiting: none  Comments: Epidural catheter removed by nursing. No redness, swelling, or drainage at puncture site.    Complete resolution of numbness. Patient is able to lift legs, bend at the knees, and ambulate.    Patient denies problems with urination.    Patient denies nausea, headache or severe back pain.         There were no known notable events for this encounter.

## 2025-01-12 NOTE — CARE PLAN
The patient's goals for the shift include bond with baby    The clinical goals for the shift include return to pre-pregnancy state    Over the shift, the patient did  make progress toward the following goals.

## 2025-01-12 NOTE — LACTATION NOTE
"Lactation Consultant Note  Lactation Consultation  Reason for Consult: Follow-up assessment  Consultant Name: Yadira Torres RN,IBCLC    Maternal Information  Has mother  before?: Yes  How long did the mother previously breastfeed?: Currently BF 3yo son 1-3x a day/at night  Infant to breast within first 2 hours of birth?: Yes  Exclusive Pump and Bottle Feed: No    Maternal Assessment  Breast Assessment: Large, Compressible, Soft  Nipple Assessment: Intact, Erect, Creased after feeding  Areola Assessment: Normal    Infant Assessment  Infant Behavior: Sleepy  Infant Assessment: Tongue protrudes over alveolar ridge, Sublingual frenulum (comment), Palate - high/arch/bubble/normal, Good cupping of tongue, Other (Comment) (Possible Posterion Band/Notch? Tongue shelving. NB is able to get into good suck with good cupping around finger, loses suction and becomes weaker/chomping suck - elevaing tongue for compression losing suction. 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 Mothers needs a lot of reminders for NB positioning versus older NB. NB eager to latch, slides off nipple and mother states good latch comes off flattended and creased)  Suck/Feeding: Sustained, Clenching/biting, Audible swallowing with stimulaton, Other (Comment) (NB very sleepy at breast, Startes out sucking but \"chomping/biting\" chin movement with out long draw in suck/ Worked on suck training jaw massage and timing latch for gaoe not for NB to nibble/slurp.)  Latch Assessment: Maximum assistance is needed, Instructed on deep latch, Latch achieved after repeated attempts, Comfortable with no pain, Bursts of sucking, " "swallowing, and rest, Upper lip turned in, Eagerly grasped on to latch, Clenched jaws, Minimal audible swallowing, Nipple - slurping/pulls/nibbles, Other (Comment) (NB slurpling on/biting. NB sliding off nipple not tucked close. NB jaw can be tight/mother states nb born in 2 pushes)    LATCH TOOL  Latch: Repeated attempts, hold nipple in mouth, stimulate to suck  Audible Swallowing: A few with stimulation  Type of Nipple: Everted (After stimulation)  Comfort (Breast/Nipple): Soft/non-tender  Hold (Positioning): Full assist, staff holds infant at breast  LATCH Score: 6    Breast Pump  Pump: Hand expression, Hospital grade electric pump  Frequency: Other (comment) (After feeds for supplementation per Peds, significant weight loss and nothing trasnferred after 20min feed with LC)  Duration: 15-20 minutes per session  Breast Shield Size and Type: 21 mm  Volume of Milk Production: 3 (3ml via pump, and \"2 hald spoon fulls with HE\" per parents)  Units of Volume: mL per session    Other OB Lactation Tools       Patient Follow-up  Inpatient Lactation Follow-up Needed : Yes  Outpatient Lactation Follow-up: Scheduled (Ref to [facility] out pt LC - refferal call done my RN IBCLC today)    Other OB Lactation Documentation  Maternal Risk Factors: Hypertension, Other (comment) (hx of anxiety and \"high risk multigravida\")  Infant Risk Factors: Early term birth 37-39 weeks    Recommendations/Summary  *late Entry - feeding from [id number]*  RN IBCLC in room to offer consult at Peds staff is concerned regarding weight loss before discharge home, Later in shift also concerned that TCB rising.  Mother is   on 25 at 0451 for viable girl \"Mcwilliams\" at 37.6weeks gestation. Mother has hx of anxiety and is on cymbalta 20mg daily and pre-existing HTN in pregnancy.NB BW 3280 25 @0451, 3280 25 @0542,3052 25 @ 0500, (-6.95%). 3012 25 @1211(8.17%). NB APGAR 9&9. TCB 0.2 @4HOL, 2.1@11HOL,5.5@23HOL,9.1@35HOL. 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 Does have EBM in deep freezer at home, she is still nursing 1yo son 1-3x a day. States that her son visited yesterday and \"she asked if it was ok for her to nurse the new baby first and she did, and then she nurses her 1yo 2-3x. Mother is able to HE on her own. Breasts are soft. She stated that when she used electric pump yesterday that she did not get anything, LC found 1ml of clear fluid in pump set up. Mother has new Spectra Pump for at home.  LC watched mother attempt to latch NB on left breast in football. Mother needs assistance with positioning. Taught ABC's of good positioning: arms around breast, infant belly to belly with parent, infant's curve of hip to parent's curve of body. Taught to have infant rest their chin on the breast below the areola. Parents instructed to wait for gape reflex elicited by chin pressure on the breast, before hugging infant close to facilitate latching. Parents require assistance from IBCLC to time latching. Mother needs maximum support timing deep latch. NB often nibbles onto nipple and slips off. LC can see shallow latch and creased nipple. Jaw movement is biting not rhythmic sucking. LC asked mother to remove NB and showed her creased nipple as mother & FOB endorsed that nipple was elongated and round. Worked with suck training and facial massage and trying to time for deep latch, and support in positioning and \"tummy to tummy.\" NB on for less than 10minutes few rhythmic sucks and swallows, then sliding off nipple and \"jaw movement\" and sleeping on breast.  Pre Weight on Scale for LC office  3005G  Post weight after feed on left side  3005G  LC attempted to assist mother in latching NB on right side in cross cradle and football hold. Encountered same issue with positioning and NB eagerly latching to nipple, showed mother " how to break latch and to take nipple down nose lips and chin again. Mother repeatedly lets NB start to chew on at nipple and does not push her in or wait for chin pressure for gape reflex.. LC worked with mother to relatch, NB still slides off and compresses nipple. Mother denies any pain. Latched for 15+ minutes. Times of rhythmic sucking and then disorganized jaw movement. NB sleeping at breast even with waking techniques. When off mother crying and hunger cues noted.  Pre weight 3005  Post weight after right side and void  3005  Dicussed POC with Parents and peds staff. Would like for mother to pump and supplement. While LC talking with peds mother states she HE 1/2 spoonful from 1 breast and 1/2 spoonful from other breast and fed to NB. NB on breast again at this time as well.  Parents would like weight, LC getting weight of [phone] at this time. NB still having hunger cues. Parent are agreeable to supplementing with mother EBM or DHM. Parents would like to bring EBM from home for next feeding. They have deep freezer of milk with 4-6ounce bag expiring in march. Peds is agreeable however NB needing supplement now. Mother expressed not getting anything from electric pump. LC discussed and offer manual pump and then mother decided to try electric pump at this time.  set up pump again and reviewed with mother ( as pump was provided previously by staff), Mother to call RN OR LC staff when done to let them know amount of EBM obtained and to request 15ml DHM.  Mother did not call staff and LC in another room. Mother obtained 3ml of EBM and fed to NB and told RN that about previous HE into spoon.  LC, RN staff and Peds have discussed and written out feeding plan as parents are agreeable multiple times. Feeding window has been so long at this time, We will initiate Latch, Pump and FOB supplements at next feeding.  Mother is still having trouble with timing and prefers to latch longer. Also states she used LC  scale  to do pre and post weight with patient and it did not change. NB is sleeping on breast. FOB has brought EBM from home. Are supplementing at this time choosing to use 20ml of EBM.  LC reassured mother this is normal and she is successful as she was expressing concern and doubts.  NB needs time and her milk needs time to stabilize with NB. Offered out pt LC support and ref made with mothers consent to [facility] out pt LC in [place].    Reviewed overnight plan with parents. FOB works in ER and is very supportive of mother and breastfeeding. Very attentive.

## 2025-01-12 NOTE — CARE PLAN
Problem: Postpartum  Goal: Experiences normal postpartum course  Outcome: Progressing  Flowsheets (Taken 2025 1948)  Experiences normal postpartum course: Monitor maternal vital signs     Problem: Vaginal Birth or  Section  Goal: Fetal and maternal status remain reassuring during the birth process  Outcome: Met  Goal: Demonstrates labor coping techniques through delivery  Outcome: Met     Problem: Safety - Adult  Goal: Free from fall injury  Flowsheets (Taken 1/10/2025 2016 by Jayde Guzman, RN)  Free from fall injury: Instruct family/caregiver on patient safety     Problem: Discharge Planning  Goal: Discharge to home or other facility with appropriate resources  Flowsheets (Taken 1/10/2025 2016 by Jayde Guzman, RN)  Discharge to home or other facility with appropriate resources: Identify barriers to discharge with patient and caregiver   The patient's goals for the shift include bond with baby    The clinical goals for the shift include BP remain wnl

## 2025-01-13 ENCOUNTER — PHARMACY VISIT (OUTPATIENT)
Dept: PHARMACY | Facility: CLINIC | Age: 35
End: 2025-01-13
Payer: COMMERCIAL

## 2025-01-13 VITALS
RESPIRATION RATE: 18 BRPM | OXYGEN SATURATION: 98 % | SYSTOLIC BLOOD PRESSURE: 126 MMHG | HEART RATE: 89 BPM | WEIGHT: 167.22 LBS | TEMPERATURE: 97.8 F | HEIGHT: 63 IN | BODY MASS INDEX: 29.63 KG/M2 | DIASTOLIC BLOOD PRESSURE: 85 MMHG

## 2025-01-13 PROBLEM — O99.013 ANEMIA AFFECTING PREGNANCY IN THIRD TRIMESTER (HHS-HCC): Status: RESOLVED | Noted: 2024-11-08 | Resolved: 2025-01-13

## 2025-01-13 PROBLEM — Z3A.36 36 WEEKS GESTATION OF PREGNANCY (HHS-HCC): Status: RESOLVED | Noted: 2024-06-14 | Resolved: 2025-01-13

## 2025-01-13 PROBLEM — O10.911 PRE-EXISTING HYPERTENSION DURING PREGNANCY IN FIRST TRIMESTER (HHS-HCC): Status: RESOLVED | Noted: 2024-06-14 | Resolved: 2025-01-13

## 2025-01-13 PROCEDURE — 2500000001 HC RX 250 WO HCPCS SELF ADMINISTERED DRUGS (ALT 637 FOR MEDICARE OP): Performed by: OBSTETRICS & GYNECOLOGY

## 2025-01-13 PROCEDURE — RXMED WILLOW AMBULATORY MEDICATION CHARGE

## 2025-01-13 PROCEDURE — 2500000002 HC RX 250 W HCPCS SELF ADMINISTERED DRUGS (ALT 637 FOR MEDICARE OP, ALT 636 FOR OP/ED): Performed by: STUDENT IN AN ORGANIZED HEALTH CARE EDUCATION/TRAINING PROGRAM

## 2025-01-13 PROCEDURE — 2500000001 HC RX 250 WO HCPCS SELF ADMINISTERED DRUGS (ALT 637 FOR MEDICARE OP): Performed by: STUDENT IN AN ORGANIZED HEALTH CARE EDUCATION/TRAINING PROGRAM

## 2025-01-13 RX ORDER — ACETAMINOPHEN 500 MG
1000 TABLET ORAL EVERY 6 HOURS PRN
Qty: 30 TABLET | Refills: 0 | Status: SHIPPED | OUTPATIENT
Start: 2025-01-13

## 2025-01-13 RX ORDER — IBUPROFEN 600 MG/1
600 TABLET ORAL EVERY 6 HOURS PRN
Qty: 30 TABLET | Refills: 0 | Status: SHIPPED | OUTPATIENT
Start: 2025-01-13

## 2025-01-13 RX ADMIN — ACETAMINOPHEN 975 MG: 325 TABLET ORAL at 08:16

## 2025-01-13 RX ADMIN — IBUPROFEN 600 MG: 600 TABLET, FILM COATED ORAL at 08:16

## 2025-01-13 RX ADMIN — IBUPROFEN 600 MG: 600 TABLET, FILM COATED ORAL at 02:47

## 2025-01-13 RX ADMIN — IBUPROFEN 600 MG: 600 TABLET, FILM COATED ORAL at 13:12

## 2025-01-13 RX ADMIN — NIFEDIPINE 60 MG: 60 TABLET, FILM COATED, EXTENDED RELEASE ORAL at 08:22

## 2025-01-13 RX ADMIN — ACETAMINOPHEN 975 MG: 325 TABLET ORAL at 02:46

## 2025-01-13 RX ADMIN — DULOXETINE HYDROCHLORIDE 20 MG: 20 CAPSULE, DELAYED RELEASE ORAL at 08:16

## 2025-01-13 RX ADMIN — ACETAMINOPHEN 975 MG: 325 TABLET ORAL at 13:12

## 2025-01-13 ASSESSMENT — PAIN SCALES - GENERAL
PAINLEVEL_OUTOF10: 0 - NO PAIN
PAINLEVEL_OUTOF10: 2
PAINLEVEL_OUTOF10: 0 - NO PAIN

## 2025-01-13 ASSESSMENT — PAIN DESCRIPTION - DESCRIPTORS: DESCRIPTORS: CRAMPING

## 2025-01-13 NOTE — LACTATION NOTE
"Lactation Consultant Note  Lactation Consultation  Reason for Consult: Follow-up assessment  Consultant Name: Yvette Mosqueda    Maternal Information  Has mother  before?: Yes  How long did the mother previously breastfeed?: Currently BF 3yo son 1-3x a day/at night  Infant to breast within first 2 hours of birth?: Yes  Exclusive Pump and Bottle Feed: No    Maternal Assessment  Breast Assessment: Large, Pendulous, Filling, Compressible  Nipple Assessment: Intact, Erect, Rounded after feeding  Areola Assessment: Normal    Infant Assessment  Infant Behavior: Awake, Rooting response, Sucking  Infant Assessment: Jaundice, Tongue protrudes over alveolar ridge, Good cupping of tongue, Good lateral movement of tongue    Feeding Assessment  Nutrition Source: Breastmilk  Feeding Method: Nursing at the breast, Feeding expressed breastmilk, Paced bottle  Feeding Position: Football/seated, Breast sandwich, Mother demonstrates good positioning  Suck/Feeding: Sustained, Baby led rhythmically, Audible swallowing, Tactile stimulation needed  Latch Assessment: Instructed on deep latch, Latch achieved, Wide open mouth < 160, Bursts of sucking, swallowing, and rest, Frequent audible swallows    LATCH TOOL  Latch: Grasps breast, tongue down, lips flanged, rhythmic sucking  Audible Swallowing: Spontaneous and intermittent (24 hours old)  Type of Nipple: Everted (After stimulation)  Comfort (Breast/Nipple): Soft/non-tender  Hold (Positioning): No assist from staff, mother able to position/hold infant  LATCH Score: 10    Breast Pump  Pump: Hospital grade electric pump, Hand expression  Frequency: Other (comment) (Hand express as needed for extra volume)  Duration: 15-20 minutes per session  Breast Shield Size and Type: 21 mm  Volume of Milk Production: 3 (3ml via pump, and \"2 hald spoon fulls with HE\" per parents)  Units of Volume: mL per session    Other OB Lactation Tools       Patient Follow-up  Inpatient Lactation Follow-up Needed " : No  Outpatient Lactation Follow-up: Scheduled (Thursday, 1/16 @ 1230 at [facility])    Other OB Lactation Documentation  Maternal Risk Factors: Hypertension  Infant Risk Factors: Early term birth 37-39 weeks    Recommendations/Summary  See previous note for history. 7.8% weight loss (50th-75th% on NEWT, gaining from yesterday). TCB 9.4@47 hours. Parents report infant cluster feeding all night. Following breastfeeding plan of latching, pumping, and supplementing with EBM from home. Mother reports only expressing drops with pump, feeling discouraged. Demonstrates hand expression, sprays of milk noted. Parents report infant has been sleepy at breast.   Infant due to feed. Diaper changed for transitional stool and void. Awake, crying, rooting. Oral exam done. Lingual frenulum midline. Tongue extends past alveolar ridge to cup tongue with good peristaltic movement and strong suction, no snap back noted.   Mother latches in football hold. Infant has wide gape, flanged lips, bursts of sucking, swallowing, and rest. Mother endorses latch comfortable. Some tactile stimulation needed to keep infant sucking. Encouraged breast compressions with feeding, mother demonstrates correctly. Increased sucking and swallowing noted with breast compressions.    Plan:  -Feed on demand, at least 8 times in 24 hours  -Supplement with EBM as needed, 15-20mL per feeding  -Pump or hand express as needed, if infant does not latch at breast for feeding or for extra volume  -Follow up with outpatient Lactation Consultant on Thursday, 1/16 (scheduled)    Discharge teaching reviewed. Taught engorgement management. Reviewed s/s of plugged ducts and mastitis and treatment. Reviewed normal feeding patterns of the “4th trimester” and outpatient support.    Parents verbalized understanding and comfort with POC. Reviewed breastfeeding resource folder, given handout on supplementation amounts per day of life/size of infant and reviewed out to  calculate.

## 2025-01-13 NOTE — CARE PLAN
The patient's goals for the shift include Breastfeeding.    The clinical goals for the shift include vitals signs WNL

## 2025-01-13 NOTE — DISCHARGE SUMMARY
Discharge Summary    Admission Date: 1/10/2025  Discharge Date: 25      Discharge Diagnosis  S/p vaginal delivery at term, chronic hypertension    Hospital Course  Delivery Date: 2025 4:51 AM  Delivery type: Vaginal, Spontaneous   GA at delivery: 37w6d  Outcome: Living  Anesthesia during delivery: Epidural  Intrapartum complications: None  Feeding method: Breastfeeding Status: Yes     Induced at 37.6 for chTN.  Uncomplicated .  BP normal on ppd#2 and strongly desired discharge.  Does not endorse headache, vision change, RUQ pain, dyspnea, or chest pain.  Pt advised that per our guidelines, we recommend a 3 d postpartum stay in hypertensive disorders of pregnancy as the blood pressure continues to rise postpartum.  The patient verbalized understanding however desires discharge.  Will discharge to home with blood pressure log and monitor and preeclampsia with severe features checklist.  Pt will have follow up appointment within a week of discharge.    Normal lochia.  Breastfeeding.     Pertinent Physical Exam At Time of Discharge  Visit Vitals  /85   Pulse 89   Temp 36.6 °C (97.8 °F) (Oral)   Resp 18       Gen: well nourished, NAD  Abd: softly distended, nontender no organomegaly  Fundus firm nontender below umbilicus  Ext: no edema, nontender, negative Millicent's      Last Vitals:  Temp Pulse Resp BP MAP Pulse Ox   36.6 °C (97.8 °F) 89 18 126/85 98 98 %     Discharge Meds     Your medication list        START taking these medications        Instructions Last Dose Given Next Dose Due   acetaminophen 500 mg tablet  Commonly known as: Tylenol Extra Strength      Take 2 tablets (1,000 mg) by mouth every 6 hours if needed for mild pain (1 - 3).       ibuprofen 600 mg tablet      Take 1 tablet (600 mg) by mouth every 6 hours if needed for mild pain (1 - 3).              CONTINUE taking these medications        Instructions Last Dose Given Next Dose Due   DULoxetine 20 mg DR capsule  Commonly known as:  Cymbalta      Take 1 capsule (20 mg) by mouth once daily.       NIFEdipine ER 60 mg 24 hr tablet  Commonly known as: Adalat CC      Take 1 tablet by mouth every day as directed       PRENATAL 19 ORAL                  STOP taking these medications      famotidine 20 mg tablet  Commonly known as: Pepcid        ferrous sulfate 325 (65 Fe) MG EC tablet        pantoprazole 40 mg EC tablet  Commonly known as: Protonix                  Where to Get Your Medications        These medications were sent to [facility]  [address] [address]      Hours: 8:30 AM to 5:00 PM Mon-Fri Phone: [phone]   acetaminophen 500 mg tablet  ibuprofen 600 mg tablet          Complications Requiring Follow-Up  chtn    Test Results Pending At Discharge  Pending Labs       Order Current Status    Surgical Pathology Exam - PLACENTA In process            Outpatient Follow-Up  Future Appointments   Date Time Provider Department [place]   1/16/2025 12:30 PM MAC [id number] LACTATION NURSE [id number] [place]   [facility]25 11:[facility]   1/24/2025 11:30 AM MD PACO Whitaker [place]   1/31/2025 11:30 AM MD PACO Whitaker [place]   3/12/2025 12:00 PM Viktor Vu MD [id number] [place]   6/30/2025  9:00 AM Mela Henry MD [id number] [facility] spent 30 minutes in the professional and overall care of this patient.      Kiana Baptiste MD

## 2025-01-13 NOTE — CARE PLAN
The patient's goals for the shift include more confidence with breastfeeding    The clinical goals for the shift include vitals and assessments WNL    The patient met the above goals. Discharge education reviewed with patient along with post birth warning signs. The patient verbalizes understanding of instructions and is ready for discharge.     Breast milk returned to patient    Problem: Postpartum  Goal: Experiences normal postpartum course  Outcome: Met     Problem: Safety - Adult  Goal: Free from fall injury  Outcome: Met     Problem: Discharge Planning  Goal: Discharge to home or other facility with appropriate resources  Outcome: Met

## 2025-01-16 ENCOUNTER — LACTATION CONSULT (OUTPATIENT)
Dept: LACTATION | Facility: CLINIC | Age: 35
End: 2025-01-16
Payer: COMMERCIAL

## 2025-01-16 DIAGNOSIS — O92.70 DISORDER OF LACTATION (HHS-HCC): Primary | ICD-10-CM

## 2025-01-16 LAB
LABORATORY COMMENT REPORT: NORMAL
PATH REPORT.FINAL DX SPEC: NORMAL
PATH REPORT.GROSS SPEC: NORMAL
PATH REPORT.RELEVANT HX SPEC: NORMAL
PATH REPORT.TOTAL CANCER: NORMAL

## 2025-01-16 PROCEDURE — 99211 OFF/OP EST MAY X REQ PHY/QHP: CPT | Performed by: OBSTETRICS & GYNECOLOGY

## 2025-01-16 ASSESSMENT — COLUMBIA-SUICIDE SEVERITY RATING SCALE - C-SSRS
2. HAVE YOU ACTUALLY HAD ANY THOUGHTS OF KILLING YOURSELF?: NO
1. IN THE PAST MONTH, HAVE YOU WISHED YOU WERE DEAD OR WISHED YOU COULD GO TO SLEEP AND NOT WAKE UP?: NO
6. HAVE YOU EVER DONE ANYTHING, STARTED TO DO ANYTHING, OR PREPARED TO DO ANYTHING TO END YOUR LIFE?: NO

## 2025-01-16 NOTE — PROGRESS NOTES
Lactation Counseling Note    Subjective:    Tamera Isaac is a 34 y.o. patient referred for lactation counseling. She is here today due to  reassurance . She was referred by her OB/GYN Minerva .     OB HISTORY:   Baby Name: Oj  /Para: : 2  Para: 2  Weeks Gestation: 37.5  Mode of Delivery: Normal vaginal route  Induction/Augmentation  Epidural/General Anesthesia  Delivery Complications: None  Maternal Complications: Preeclampsia  Cope Information: : 25  [facility] of [facility]: [facility] Provider: Ibrahima  APGARS: Unknown by parent here today.  Skin to skin contact: First hour  Birth weight: 7 lb 4 oz  Birth length: 19.5 inches  Discharge weight: 6 lb 10 oz  Complications: latch issues    Objective:    BREASTFEEDING ASSESSMENT:   Physical Exam    Breast Assessment: Large, Engorged, and Soft  Nipple Assessment/Stage: intact and erect none  Areola: Normal  Feeding Position: c - hold, football/seated, nose or chin not touching breast, and baby's head too high over breast  Latch: moderate assistance is needed, deep latch obtained, latch achieved, comfortable with no pain, and sucking and swallowing  Suck/Feeding: audible swallowing, audible swallowing with stimulation, and content after feeding  Infant Behavior: awake, quiet alert, and content after feeding  Infant Information: Good cupping of tongue  Good lateral movement of the tongue  Able to elevate tongue to roof of mouth  Fontanel: flat  Mucous Membranes: moist  Breast Pain: Pain scale 0-10 (10 most pain): 0  Nipple Pain: Pain scale 0-10 (10 most pain): 0  Weight: Reported pediatrician visit weight: 6 lb 10 oz  Date of pediatrician weight: 25  Weight before feedin gm  Weight after feedin gm  Amount of breast milk transferred: 40 ml  Number of voids in the last 24 hours: 6  Number of stools in the last 24 hours: 6  Baby redressed and offered dessert.  PATIENT DISCUSSION SUMMARY:    LACTATION PROBLEMS AND  STRATEGIES:  Problems latching baby to breast: Baby should latch to areola (dark area) not just the nipple.  Baby's lips should be flanged outward.  Bring the baby up to the level of your breast by putting a pillow under the baby.  Do not use bottles or pacifiers until latching on well.  Dribble milk over the nipple or express milk so that baby can taste it  Encourage a deeper latch with the asymetrical latch- lining baby's nose opposite mother's nipple.  Encourage nipple to stand up prior to feeing by pumping, nipple rolling or by brief application of ice.  Gently tickle your baby's lip with your nipple to encourage your baby to open his/her mouth wide.  Hold baby so that your breast is positioned deep in the baby's mouth.  Hold your baby close, tummy to tummy.  If baby does not latch to breast, express breast milk.  If engorged, express some milk to soften breast.  If the baby is not latched on well, break seal and gently try again.  Keep baby skin to skin and watch for feeding cues.  Massage breast to start milk-ejection reflex.  Place nipple and at least 1 inch of areola into baby's mouth.  Place your hand behind the baby's neck and shoulder.  Do not force baby's head into breast.  Put baby in the football hold or clutch hold, supporting his/her neck and head in sniffing position to open his/her throat.  Shape breast into oval shape (sandwich hold)  for deep latch.  Try different feeding positions (cradle, football, side etc.).  Use a breast feeding pillow to bring baby up to the level of the breast.  Use nipple shield and monitor baby's weight gain and output.  Use semi-reclined feeding position to allow baby to take an active role and trigger baby's inborn feeding behavior.  Use your hand to support your breast during feeding.  When your baby's mouth is wide open, quickly pull baby into your breast.  Your baby's chin should be pressed into your breast.  PATIENT INSTRUCTION HANDOUTS GIVEN:   Mom and Baby,  Too  LACTATION EDUCATION:  Waking Techniques, Correct Positioning, and Correct Latch On

## 2025-01-16 NOTE — PATIENT INSTRUCTIONS
Problems latching baby to breast: Baby should latch to areola (dark area) not just the nipple.  Baby's lips should be flanged outward.  Bring the baby up to the level of your breast by putting a pillow under the baby.  Do not use bottles or pacifiers until latching on well.  Dribble milk over the nipple or express milk so that baby can taste it  Encourage a deeper latch with the asymetrical latch- lining baby's nose opposite mother's nipple.  Encourage nipple to stand up prior to feeing by pumping, nipple rolling or by brief application of ice.  Gently tickle your baby's lip with your nipple to encourage your baby to open his/her mouth wide.  Hold baby so that your breast is positioned deep in the baby's mouth.  Hold your baby close, tummy to tummy.  If baby does not latch to breast, express breast milk.  If engorged, express some milk to soften breast.  If the baby is not latched on well, break seal and gently try again.  Keep baby skin to skin and watch for feeding cues.  Massage breast to start milk-ejection reflex.  Place nipple and at least 1 inch of areola into baby's mouth.  Place your hand behind the baby's neck and shoulder.  Do not force baby's head into breast.  Put baby in the football hold or clutch hold, supporting his/her neck and head in sniffing position to open his/her throat.  Shape breast into oval shape (sandwich hold)  for deep latch.  Try different feeding positions (cradle, football, side etc.).  Use a breast feeding pillow to bring baby up to the level of the breast.  Use nipple shield and monitor baby's weight gain and output.  Use semi-reclined feeding position to allow baby to take an active role and trigger baby's inborn feeding behavior.  Use your hand to support your breast during feeding.  When your baby's mouth is wide open, quickly pull baby into your breast.  Your baby's chin should be pressed into your breast.

## 2025-01-17 ENCOUNTER — PHARMACY VISIT (OUTPATIENT)
Dept: PHARMACY | Facility: CLINIC | Age: 35
End: 2025-01-17
Payer: COMMERCIAL

## 2025-01-17 ENCOUNTER — TELEPHONE (OUTPATIENT)
Dept: LACTATION | Facility: CLINIC | Age: 35
End: 2025-01-17

## 2025-01-17 ENCOUNTER — APPOINTMENT (OUTPATIENT)
Dept: OBSTETRICS AND GYNECOLOGY | Facility: CLINIC | Age: 35
End: 2025-01-17
Payer: COMMERCIAL

## 2025-01-17 VITALS
DIASTOLIC BLOOD PRESSURE: 86 MMHG | HEIGHT: 63 IN | BODY MASS INDEX: 27 KG/M2 | SYSTOLIC BLOOD PRESSURE: 124 MMHG | WEIGHT: 152.4 LBS

## 2025-01-17 DIAGNOSIS — F41.9 ANXIETY: ICD-10-CM

## 2025-01-17 PROBLEM — O09.43 HIGH RISK MULTIGRAVIDA IN THIRD TRIMESTER (HHS-HCC): Status: RESOLVED | Noted: 2024-11-08 | Resolved: 2025-01-17

## 2025-01-17 PROCEDURE — 0503F POSTPARTUM CARE VISIT: CPT | Performed by: OBSTETRICS & GYNECOLOGY

## 2025-01-17 PROCEDURE — RXMED WILLOW AMBULATORY MEDICATION CHARGE

## 2025-01-17 RX ORDER — DULOXETIN HYDROCHLORIDE 30 MG/1
30 CAPSULE, DELAYED RELEASE ORAL DAILY
Qty: 30 CAPSULE | Refills: 6 | Status: SHIPPED | OUTPATIENT
Start: 2025-01-17 | End: 2026-01-17

## 2025-01-17 ASSESSMENT — EDINBURGH POSTNATAL DEPRESSION SCALE (EPDS)
I HAVE BLAMED MYSELF UNNECESSARILY WHEN THINGS WENT WRONG: NOT VERY OFTEN
I HAVE FELT SAD OR MISERABLE: NOT VERY OFTEN
I HAVE FELT SCARED OR PANICKY FOR NO GOOD REASON: YES, SOMETIMES
I HAVE LOOKED FORWARD WITH ENJOYMENT TO THINGS: AS MUCH AS I EVER DID
I HAVE BEEN ANXIOUS OR WORRIED FOR NO GOOD REASON: YES, SOMETIMES
TOTAL SCORE: 9
I HAVE BEEN ABLE TO LAUGH AND SEE THE FUNNY SIDE OF THINGS: NOT QUITE SO MUCH NOW
I HAVE BEEN SO UNHAPPY THAT I HAVE HAD DIFFICULTY SLEEPING: NOT AT ALL
I HAVE BEEN SO UNHAPPY THAT I HAVE BEEN CRYING: ONLY OCCASIONALLY
THE THOUGHT OF HARMING MYSELF HAS OCCURRED TO ME: NEVER
THINGS HAVE BEEN GETTING ON TOP OF ME: NO, MOST OF THE TIME I HAVE COPED QUITE WELL

## 2025-01-17 ASSESSMENT — PAIN SCALES - GENERAL: PAINLEVEL_OUTOF10: 0-NO PAIN

## 2025-01-17 NOTE — PROGRESS NOTES
"Patient presents for her 1 week Postpartum Visit, BP check - cHTN Nifedipine 60mg daily  Delivered  2025  EPDS = 9  Breastfeeding: Yes   C/O: None     Kayy Holder MA II    Patient was identified as a fall risk. Risk prevention instructions provided.    Postpartum Visit    HPI:  Pt presents for her blood pressure check  s/p  on  without complications.  She had a baby girl.  She is Breast feeding. She feels emotional, but went to breastfeeding support group and found that to be very helpful.  She is also tandem nursing her son which she says has actually been helpful.  She does want to discuss increasing her duloxetine dose.  Her blood pressure has remained well controlled on her nifedipine XL 60mg daily.     ROS:  Denies the following: Complains of the following:    - episiotomy site pain   - incisional pain  - incisional drainage  - heavy bleeding  - irregular bleeding  - breast pain  - cracked nipples  - breastfeeding problems  - abdominal pain  - vaginal discharge  - shortness of breath  - chest pain  - back pain  - leg pain  - depression  - suicidal ideation  - nausea  - vomiting  - fever  - pain with urination  - tiredness or fatigue  - headache no pertinent positives        O:  /86 (BP Location: Right arm, Patient Position: Sitting)   Ht 1.6 m (5' 3\")   Wt 69.1 kg (152 lb 6.4 oz)   LMP 04/15/2024 (Exact Date)   Breastfeeding Yes   BMI 27.00 kg/m²     General Appearance   - consistent with stated age, well groomed and cooperative    Integumentary  - skin warm and dry without rash    Head and Neck  - normalocephalic and neck supple    Chest and Lung Exam  - normal breathing effort, no respiratory distress    Peripheral Vascular  - no edema present    A/P:   Pt is a 34 y.o.  who presents for blood pressure check .  Bp well controlled on nifedipine XL 60mg daily.  Continue.  Doing well overall postpartum.  Coping well with new baby at home.  [place]  Depression Scale " Total: 9.  Takes duloxetine and wants to try increasing her dose to help with postpartum stressors.  Dose increased to 30mg daily.  Encouraged her to continue going to breastfeeding support group which she has found useful thus far.  Breastfeeding going well.  . Baby doing well. F/u for 6 week postpartum visit or sooner as needed.

## 2025-01-21 ENCOUNTER — TELEPHONE (OUTPATIENT)
Dept: OBSTETRICS AND GYNECOLOGY | Facility: CLINIC | Age: 35
End: 2025-01-21

## 2025-01-21 ENCOUNTER — TELEMEDICINE (OUTPATIENT)
Dept: OBSTETRICS AND GYNECOLOGY | Facility: CLINIC | Age: 35
End: 2025-01-21
Payer: COMMERCIAL

## 2025-01-21 VITALS
BODY MASS INDEX: 25.87 KG/M2 | DIASTOLIC BLOOD PRESSURE: 98 MMHG | HEIGHT: 63 IN | WEIGHT: 146 LBS | SYSTOLIC BLOOD PRESSURE: 136 MMHG

## 2025-01-21 DIAGNOSIS — F41.9 ANXIETY: Primary | ICD-10-CM

## 2025-01-21 PROCEDURE — 3008F BODY MASS INDEX DOCD: CPT | Performed by: ADVANCED PRACTICE MIDWIFE

## 2025-01-21 PROCEDURE — 99213 OFFICE O/P EST LOW 20 MIN: CPT | Performed by: ADVANCED PRACTICE MIDWIFE

## 2025-01-21 PROCEDURE — 1036F TOBACCO NON-USER: CPT | Performed by: ADVANCED PRACTICE MIDWIFE

## 2025-01-21 ASSESSMENT — EDINBURGH POSTNATAL DEPRESSION SCALE (EPDS)
I HAVE BLAMED MYSELF UNNECESSARILY WHEN THINGS WENT WRONG: NO, NEVER
I HAVE BEEN SO UNHAPPY THAT I HAVE BEEN CRYING: YES, MOST OF THE TIME
THINGS HAVE BEEN GETTING ON TOP OF ME: YES, SOMETIMES I HAVEN'T BEEN COPING AS WELL AS USUAL
TOTAL SCORE: 17
I HAVE BEEN ABLE TO LAUGH AND SEE THE FUNNY SIDE OF THINGS: DEFINITELY NOT SO MUCH NOW
I HAVE BEEN SO UNHAPPY THAT I HAVE HAD DIFFICULTY SLEEPING: NOT AT ALL
I HAVE FELT SCARED OR PANICKY FOR NO GOOD REASON: YES, QUITE A LOT
I HAVE FELT SAD OR MISERABLE: YES, MOST OF THE TIME
I HAVE LOOKED FORWARD WITH ENJOYMENT TO THINGS: RATHER LESS THAN I USED TO
I HAVE BEEN ANXIOUS OR WORRIED FOR NO GOOD REASON: YES, VERY OFTEN
THE THOUGHT OF HARMING MYSELF HAS OCCURRED TO ME: NEVER

## 2025-01-21 NOTE — TELEPHONE ENCOUNTER
Called and spoke with patient today regarding her message. States that since her PPV with Dr Palma on 1/17/25, she has been getting worse PPD over time, even with the increase of Cymbalta from 20mg to 30mg. Patient is very tearful on the phone stating she has been crying for hours, but reports no self harm or wanting to harm others. Performed an EPDS with patient over the phone, she scored a 16.     Advised patient that in times like this, we like them to be seen ASAP for follow up. Pt agreed to see IRVING Heredia today for a virtual visit at 1:20pm to discuss symptoms and other possible treatment. Macy Nettles was made aware of the patient and current status. Instructed patient to call us back before her appointment today if she had any other issues, concerns or questions. Pt vocalized understanding.

## 2025-01-23 NOTE — PROGRESS NOTES
"Subjective    Tamera Isaac is a 34 y.o.  who presents postpartum day 10 with c/o crying all the time, all day, every day since she has been home. Had a  25, hx of PPD with her 1st. Has been on 20 mg Cymbalta, recently increased to 30 mg by Dr Palma.  Postpartum Depression: High Risk (2025)    [place]  Depression Scale     Last EPDS Total Score: 17     Last EPDS Self Harm Result: Never     Discussed triggers, feels as though she is normally a busy person, always doing something, very active. Feels as though, the \"down time\" since being home with baby, may be contributing to how she is feeling. Reports that she has good support at home, getting sleep. Currently breastfeeding.  Does not have a therapist, discussed therapy and medication is usually the best form of treatment. Encouraged to find a therapist and/or join a support group. Referral to Verenice Lagos ordered, list of online and in person PP support sent through [employer],  as well as , list of counseling services in the area.   Encouraged to give the dosage increase time to work, discussed adding another medication however, after looking into this, Wellbutrin can be added to increase SSRI efficacy but can cause negative side effects if taken with SNRIs. Will defer to Dr Palma and/or Verenice Lagos for further pharmacologic treatment options.    Objective    Visit Vitals  BP (!) 136/98      Exam    Physical Exam  Constitutional:       Appearance: Normal appearance.   HENT:      Head: Normocephalic and atraumatic.   Pulmonary:      Effort: Pulmonary effort is normal.   Musculoskeletal:         General: Normal range of motion.   Neurological:      General: No focal deficit present.      Mental Status: She is alert and oriented to person, place, and time.   Psychiatric:         Mood and Affect: Mood normal.         Behavior: Behavior normal.   Vitals reviewed.      Assessment/Plan    Postpartum " anxiety/depression  Discussed treatment options including therapy, medication, or a combination of both   List of online and in person therapy and support groups sent through [employer]  Referred to Verenice Lagos pt to schedule    Return to care in 4 weeks for 6 wk PP visit or sooner as needed.

## 2025-01-24 ENCOUNTER — APPOINTMENT (OUTPATIENT)
Dept: OBSTETRICS AND GYNECOLOGY | Facility: CLINIC | Age: 35
End: 2025-01-24
Payer: COMMERCIAL

## 2025-01-31 ENCOUNTER — APPOINTMENT (OUTPATIENT)
Dept: OBSTETRICS AND GYNECOLOGY | Facility: CLINIC | Age: 35
End: 2025-01-31
Payer: COMMERCIAL

## 2025-02-03 ENCOUNTER — SOCIAL WORK (OUTPATIENT)
Dept: BEHAVIORAL HEALTH | Facility: CLINIC | Age: 35
End: 2025-02-03
Payer: COMMERCIAL

## 2025-02-03 PROCEDURE — 90791 PSYCH DIAGNOSTIC EVALUATION: CPT | Mod: AJ,95
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
## 2025-02-07 ENCOUNTER — CLINICAL SUPPORT (OUTPATIENT)
Dept: BEHAVIORAL HEALTH | Facility: CLINIC | Age: 35
End: 2025-02-07
Payer: COMMERCIAL

## 2025-02-07 PROCEDURE — 90853 GROUP PSYCHOTHERAPY: CPT | Mod: AJ,95

## 2025-02-07 NOTE — GROUP NOTE
Group Topic: Coping Skills   Group Date: 2/7/2025  Start Time: 12:00 PM  End Time:  1:00 PM  Facilitators: MAI Arita   Department: [facility]        Name: Tamera Isaac YOB: 1990   MR: [id number]      This was a video IOP group on a HIPAA compliant platform. All patients were provided with informed consent.  Date: 02/07/25, Time: 12pm-1pm group, Number of Patients:2, User Name: MAI boyer  Number of Staff: 1    Group topic(s): Building support and communication  Facilitated interpersonal inventory activity with group. Asked group members follow up questions regarding their support system. Discussed “I” statement and jumping to conclusions. Encouraged patient to consider problematic areas of their communication. Reviewed effective ways to ask for help including assertive requests. Patients given the opportunity to provide feedback and ask questions.     Facilitator: MAI Luna

## 2025-02-11 ENCOUNTER — APPOINTMENT (OUTPATIENT)
Dept: BEHAVIORAL HEALTH | Facility: CLINIC | Age: 35
End: 2025-02-11
Payer: COMMERCIAL

## 2025-02-11 DIAGNOSIS — F41.9 ANXIETY: ICD-10-CM

## 2025-02-11 PROCEDURE — 99205 OFFICE O/P NEW HI 60 MIN: CPT | Performed by: CLINICAL NURSE SPECIALIST
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
## 2025-02-12 PROCEDURE — RXMED WILLOW AMBULATORY MEDICATION CHARGE

## 2025-02-12 RX ORDER — DULOXETIN HYDROCHLORIDE 20 MG/1
40 CAPSULE, DELAYED RELEASE ORAL DAILY
Qty: 60 CAPSULE | Refills: 2 | Status: SHIPPED | OUTPATIENT
Start: 2025-02-12 | End: 2026-02-12

## 2025-02-13 ENCOUNTER — PHARMACY VISIT (OUTPATIENT)
Dept: PHARMACY | Facility: CLINIC | Age: 35
End: 2025-02-13
Payer: COMMERCIAL

## 2025-02-14 ENCOUNTER — APPOINTMENT (OUTPATIENT)
Dept: BEHAVIORAL HEALTH | Facility: CLINIC | Age: 35
End: 2025-02-14
Payer: COMMERCIAL

## 2025-02-18 ENCOUNTER — TELEPHONE (OUTPATIENT)
Dept: DERMATOLOGY | Facility: CLINIC | Age: 35
End: 2025-02-18
Payer: COMMERCIAL

## 2025-02-18 NOTE — TELEPHONE ENCOUNTER
Patient called and left a VM on Dr. Morgan line stating that Central Scheduling transferred her in order for us to schedule her an appointment for an open appointment that was available.  I will forward to the [facility] Desk Team to have her scheduled accordingly.

## 2025-02-19 ENCOUNTER — APPOINTMENT (OUTPATIENT)
Dept: OBSTETRICS AND GYNECOLOGY | Facility: CLINIC | Age: 35
End: 2025-02-19
Payer: COMMERCIAL

## 2025-02-19 VITALS
DIASTOLIC BLOOD PRESSURE: 84 MMHG | WEIGHT: 141.8 LBS | HEIGHT: 63 IN | BODY MASS INDEX: 25.12 KG/M2 | SYSTOLIC BLOOD PRESSURE: 128 MMHG

## 2025-02-19 DIAGNOSIS — Z12.4 CERVICAL CANCER SCREENING: ICD-10-CM

## 2025-02-19 PROCEDURE — 87624 HPV HI-RISK TYP POOLED RSLT: CPT

## 2025-02-19 ASSESSMENT — EDINBURGH POSTNATAL DEPRESSION SCALE (EPDS)
I HAVE BEEN SO UNHAPPY THAT I HAVE HAD DIFFICULTY SLEEPING: NOT AT ALL
I HAVE LOOKED FORWARD WITH ENJOYMENT TO THINGS: AS MUCH AS I EVER DID
I HAVE FELT SAD OR MISERABLE: NOT VERY OFTEN
THINGS HAVE BEEN GETTING ON TOP OF ME: NO, MOST OF THE TIME I HAVE COPED QUITE WELL
I HAVE BLAMED MYSELF UNNECESSARILY WHEN THINGS WENT WRONG: NO, NEVER
THE THOUGHT OF HARMING MYSELF HAS OCCURRED TO ME: NEVER
I HAVE BEEN SO UNHAPPY THAT I HAVE BEEN CRYING: ONLY OCCASIONALLY
TOTAL SCORE: 6
I HAVE BEEN ABLE TO LAUGH AND SEE THE FUNNY SIDE OF THINGS: NOT QUITE SO MUCH NOW
I HAVE BEEN ANXIOUS OR WORRIED FOR NO GOOD REASON: YES, SOMETIMES
I HAVE FELT SCARED OR PANICKY FOR NO GOOD REASON: NO, NOT AT ALL

## 2025-02-19 ASSESSMENT — PAIN SCALES - GENERAL: PAINLEVEL_OUTOF10: 0-NO PAIN

## 2025-02-19 NOTE — PROGRESS NOTES
"Patient presents for her 6 week Postpartum Visit.  Delivered  2025  Nifedipine 60mg daily   EPDS = 6  Pap:  HPV-  LMP: Absent  Breastfeeding: Yes   Birth Control: Discuss   C/O: Doing better     Kayy Holder MA II    Postpartum Visit    HPI:  Pt presents for her 6 week postpartum visit s/p  on  without complications.  She had a baby girl.  She is Breast feeding.     She has been struggling with post-partum depression.  She has seen Verenice Lagos and her dose of cymbalta has been altered.  She is still struggling but is feeling better.  She has also established care for ongoing therapy.      ROS:  Denies the following: Complains of the following:    - episiotomy site pain   - incisional pain  - incisional drainage  - heavy bleeding  - irregular bleeding  - breast pain  - cracked nipples  - breastfeeding problems  - abdominal pain  - vaginal discharge  - shortness of breath  - chest pain  - back pain  - leg pain  - depression  - suicidal ideation  - nausea  - vomiting  - fever  - pain with urination  - tiredness or fatigue  - headache no pertinent positives        O:  /84 (BP Location: Right arm, Patient Position: Sitting)   Ht 1.6 m (5' 3\")   Wt 64.3 kg (141 lb 12.8 oz)   Breastfeeding Yes   BMI 25.12 kg/m²     General Appearance   - consistent with stated age, well groomed and cooperative    Integumentary  - skin warm and dry without rash    Head and Neck  - normalocephalic and neck supple    Chest and Lung Exam  - normal breathing effort, no respiratory distress    Abdomen  - soft, nontender and no hepatomegaly, splenomegaly, or mass    Female Genitourinary  - vulva normal without rash or lesion, normal vaginal rugae, no vaginal discharge, uterus normal size & no palpable masses, no adnexal mass, no adnexal tenderness, no cervical motion tenderness    Peripheral Vascular  - no edema present    A/P:   Pt is a 34 y.o.  who presents for 6 week postpartum visit.  Doing well overall " postpartum.  Coping well with new baby at home.  [place]  Depression Scale Total: 6.  Breastfeeding going well.  . Baby doing well.  Contraception discussed - planning hormonal IUD - will schedule appt for insertion.  Pap done today.  Continue current care for her postpartum depression.  Continue current anti-hypertensives - has f/u with her PCP scheduled. f/u for annual exam or as needed.

## 2025-02-21 ENCOUNTER — APPOINTMENT (OUTPATIENT)
Dept: BEHAVIORAL HEALTH | Facility: CLINIC | Age: 35
End: 2025-02-21
Payer: COMMERCIAL

## 2025-02-24 ENCOUNTER — APPOINTMENT (OUTPATIENT)
Dept: BEHAVIORAL HEALTH | Facility: CLINIC | Age: 35
End: 2025-02-24
Payer: COMMERCIAL

## 2025-02-24 DIAGNOSIS — F41.9 ANXIETY: ICD-10-CM

## 2025-02-24 PROCEDURE — 90837 PSYTX W PT 60 MINUTES: CPT | Performed by: PSYCHOLOGIST
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
## 2025-02-25 ENCOUNTER — APPOINTMENT (OUTPATIENT)
Dept: OBSTETRICS AND GYNECOLOGY | Facility: CLINIC | Age: 35
End: 2025-02-25
Payer: COMMERCIAL

## 2025-02-25 ENCOUNTER — TELEPHONE (OUTPATIENT)
Dept: BEHAVIORAL HEALTH | Facility: CLINIC | Age: 35
End: 2025-02-25

## 2025-02-28 ENCOUNTER — APPOINTMENT (OUTPATIENT)
Dept: BEHAVIORAL HEALTH | Facility: CLINIC | Age: 35
End: 2025-02-28
Payer: COMMERCIAL

## 2025-03-07 ENCOUNTER — APPOINTMENT (OUTPATIENT)
Dept: BEHAVIORAL HEALTH | Facility: CLINIC | Age: 35
End: 2025-03-07
Payer: COMMERCIAL

## 2025-03-12 ENCOUNTER — APPOINTMENT (OUTPATIENT)
Dept: NEPHROLOGY | Facility: CLINIC | Age: 35
End: 2025-03-12
Payer: COMMERCIAL

## 2025-03-12 VITALS
HEIGHT: 63 IN | WEIGHT: 144.8 LBS | BODY MASS INDEX: 25.66 KG/M2 | DIASTOLIC BLOOD PRESSURE: 78 MMHG | HEART RATE: 120 BPM | SYSTOLIC BLOOD PRESSURE: 112 MMHG

## 2025-03-12 DIAGNOSIS — I10 ESSENTIAL HYPERTENSION: Primary | ICD-10-CM

## 2025-03-12 PROCEDURE — 3008F BODY MASS INDEX DOCD: CPT | Performed by: INTERNAL MEDICINE

## 2025-03-12 PROCEDURE — 3074F SYST BP LT 130 MM HG: CPT | Performed by: INTERNAL MEDICINE

## 2025-03-12 PROCEDURE — 3078F DIAST BP <80 MM HG: CPT | Performed by: INTERNAL MEDICINE

## 2025-03-12 PROCEDURE — 1036F TOBACCO NON-USER: CPT | Performed by: INTERNAL MEDICINE

## 2025-03-12 PROCEDURE — 99213 OFFICE O/P EST LOW 20 MIN: CPT | Performed by: INTERNAL MEDICINE

## 2025-03-12 NOTE — PROGRESS NOTES
Tamera Isaac   34 y.o.    @@  N/Room: [id number]/Room/bed info not found    Subjective:   The patient is being seen for a routine clinic follow-up of chronic kidney disease. Recently, the disease has been stable. Disease complications:  No hyperkalemia, no hypocalcemia, no hyperphosphatemia, no metabolic acidosis, no coagulopathy, no uremic encephalopathy, no neuropathy and no renal osteodystrophy. The patient is currently asymptomatic. No associated symptoms are reported.       Meds:   Current Outpatient Medications   Medication Sig Dispense Refill    acetaminophen (Tylenol Extra Strength) 500 mg tablet Take 2 tablets (1,000 mg) by mouth every 6 hours if needed for mild pain (1 - 3). 30 tablet 0    DULoxetine (Cymbalta) 20 mg DR capsule Take 2 capsules (40 mg) by mouth once daily. Do not crush or chew. 60 capsule 2    ibuprofen 600 mg tablet Take 1 tablet (600 mg) by mouth every 6 hours if needed for mild pain (1 - 3). 30 tablet 0    NIFEdipine ER (Adalat CC) 60 mg 24 hr tablet Take 1 tablet by mouth every day as directed 330 tablet 0    prenatal no115/iron/folic acid (PRENATAL 19 ORAL) Take 1 tablet by mouth early in the morning..       No current facility-administered medications for this visit.          ROS:  The patient is awake and oriented. No dizziness or lightheadedness. No chills and no fever. No headaches. No nausea and no vomiting. No shortness of breath. No cough. No chest pain. No abdominal pain. No diarrhea. No hematemesis or hemoptysis. No hematuria. No rectal bleeding. No melena. No epistaxis. No urinary symptoms. No flank pain. No leg edema. No itching. Overall, the rest of the review of systems is also negative.  12 point review of systems otherwise negative as stated in HPI.        Physical Examination:        Vitals:    03/12/25 1209   BP: 112/78   Pulse: (!) 120     General: The patient is awake, oriented, and is not in any distress.  Head and Neck: Normocephalic. No periorbital  edema.  Eyes: non-icteric  Respiratory: Symmetric chest expansion. No respiratory distress.  Skin: No maculopapular rash.  Musculoskeletal: No peripheral edema.  Neuro Exam: Speech is fluent. Moves extremities.    Imaging:  === 04/20/24 ===    US HEAD NECK SOFT TISSUE    - Impression -  Palpable lump in the left postauricular region corresponds to a  nonspecific subcentimeter lymph node.    MACRO:  None.    Signed by: Angel Paulino 4/22/2024 11:41 AM  Dictation workstation:   [id number]       Blood Labs:  No results found for this or any previous visit (from the past 24 hours).   Lab Results   Component Value Date    PROTUR <4 (L) 09/26/2019    PROTUR NEGATIVE 09/26/2019      Lab Results   Component Value Date    GLUCOSE 85 01/10/2025    CALCIUM 8.0 (L) 01/10/2025     (L) 01/10/2025    K 3.7 01/10/2025    CO2 20 (L) 01/10/2025     01/10/2025    BUN 11 01/10/2025    CREATININE 0.60 01/10/2025         Assessment and Plan:  1. Hypertension.  Blood pressure is perfectly under control.  Continue the current medication.    Overall stable from renal standpoint.  She can follow-up with her primary care physician and I will see her as needed.              Viktor Vu MD  Senior Attending Physician  Director of Onco-Nephrology Program  Division of Nephrology & Hypertension  [facility]

## 2025-03-13 ENCOUNTER — PHARMACY VISIT (OUTPATIENT)
Dept: PHARMACY | Facility: CLINIC | Age: 35
End: 2025-03-13
Payer: COMMERCIAL

## 2025-03-13 PROCEDURE — RXMED WILLOW AMBULATORY MEDICATION CHARGE

## 2025-03-14 ENCOUNTER — APPOINTMENT (OUTPATIENT)
Dept: BEHAVIORAL HEALTH | Facility: CLINIC | Age: 35
End: 2025-03-14
Payer: COMMERCIAL

## 2025-03-18 ENCOUNTER — PHARMACY VISIT (OUTPATIENT)
Dept: PHARMACY | Facility: CLINIC | Age: 35
End: 2025-03-18
Payer: COMMERCIAL

## 2025-03-21 ENCOUNTER — APPOINTMENT (OUTPATIENT)
Dept: NEPHROLOGY | Facility: CLINIC | Age: 35
End: 2025-03-21
Payer: COMMERCIAL

## 2025-05-15 ENCOUNTER — PHARMACY VISIT (OUTPATIENT)
Dept: PHARMACY | Facility: CLINIC | Age: 35
End: 2025-05-15
Payer: COMMERCIAL

## 2025-05-15 PROCEDURE — RXMED WILLOW AMBULATORY MEDICATION CHARGE

## 2025-06-10 ENCOUNTER — APPOINTMENT (OUTPATIENT)
Dept: DERMATOLOGY | Facility: CLINIC | Age: 35
End: 2025-06-10
Payer: COMMERCIAL

## 2025-06-10 DIAGNOSIS — L57.9 SKIN CHANGES DUE TO CHRONIC EXPOSURE TO NONIONIZING RADIATION: Primary | ICD-10-CM

## 2025-06-10 DIAGNOSIS — Z12.83 SCREENING EXAM FOR SKIN CANCER: ICD-10-CM

## 2025-06-10 DIAGNOSIS — D22.9 MELANOCYTIC NEVUS, UNSPECIFIED LOCATION: ICD-10-CM

## 2025-06-10 DIAGNOSIS — L81.4 LENTIGO: ICD-10-CM

## 2025-06-10 PROCEDURE — 99203 OFFICE O/P NEW LOW 30 MIN: CPT | Performed by: STUDENT IN AN ORGANIZED HEALTH CARE EDUCATION/TRAINING PROGRAM

## 2025-06-10 NOTE — PROGRESS NOTES
Subjective     Tamera Isaac is a 34 y.o. female who presents for the following: Skin Check (FBSE. No Hx of skin cancer. Patient is concerned with mole mid back. ).          Review of Systems:  No other skin or systemic complaints other than what is documented elsewhere in the note.    The following portions of the chart were reviewed this encounter and updated as appropriate:         Skin Cancer History  Biopsy Log Book  No skin cancers from Specimen Tracking.    Additional History      Specialty Problems    None       Objective   Well appearing patient in no apparent distress; mood and affect are within normal limits.    A full examination was performed including scalp, head, eyes, ears, nose, lips, neck, chest, axillae, abdomen, back, buttocks, bilateral upper extremities, bilateral lower extremities, hands, feet, fingers, toes, fingernails, and toenails. All findings within normal limits unless otherwise noted below.    Assessment/Plan   Skin Exam  1. SKIN CHANGES DUE TO CHRONIC EXPOSURE TO NONIONIZING RADIATION  Generalized  Mottled pigmentation, telangiectasias and brown reticular macules in sun exposed areas on the face, extremities, trunk  The risk of chronic, cumulative sun damage and risk of development of skin cancer was reviewed with the patient today. Discussed important of sun protection with sun protective clothing and/or broad spectrum sunscreen spf 30 or above.  Warning signs of skin cancer were reviewed. Patient to contact office should they notice any new or changing pre-existing skin lesion.  2. SCREENING EXAM FOR SKIN CANCER  Generalized  3. LENTIGO  Generalized  Scattered tan macules in sun-exposed areas.  Benign nature of these skin lesions reviewed and relation to sun exposure discussed. Reassurance provided. Reviewed warning signs of skin cancer.  4. MELANOCYTIC NEVUS, UNSPECIFIED LOCATION  Generalized  Benign to slightly atypical appearing brown pigmented macules and papules  Clinically  benign appearing nevi, reassurance provided to the patient today. Discussed important of sun protection with sun protective clothing and/or broad spectrum sunscreen spf 30 or above.  ABCDEs of melanoma reviewed. Patient to f/u should they notice any new or changing pre-existing skin lesion.

## 2025-06-25 DIAGNOSIS — F41.9 ANXIETY: ICD-10-CM

## 2025-06-25 RX ORDER — DULOXETIN HYDROCHLORIDE 20 MG/1
40 CAPSULE, DELAYED RELEASE ORAL DAILY
Qty: 60 CAPSULE | Refills: 2 | OUTPATIENT
Start: 2025-06-25 | End: 2026-06-25

## 2025-06-30 ENCOUNTER — APPOINTMENT (OUTPATIENT)
Dept: PRIMARY CARE | Facility: CLINIC | Age: 35
End: 2025-06-30
Payer: COMMERCIAL

## 2025-06-30 ENCOUNTER — PHARMACY VISIT (OUTPATIENT)
Dept: PHARMACY | Facility: CLINIC | Age: 35
End: 2025-06-30
Payer: COMMERCIAL

## 2025-06-30 VITALS
HEART RATE: 85 BPM | SYSTOLIC BLOOD PRESSURE: 138 MMHG | BODY MASS INDEX: 25.19 KG/M2 | OXYGEN SATURATION: 98 % | TEMPERATURE: 97 F | WEIGHT: 142.2 LBS | DIASTOLIC BLOOD PRESSURE: 86 MMHG

## 2025-06-30 DIAGNOSIS — I10 ESSENTIAL HYPERTENSION: ICD-10-CM

## 2025-06-30 DIAGNOSIS — F41.9 ANXIETY: ICD-10-CM

## 2025-06-30 DIAGNOSIS — I10 PRIMARY HYPERTENSION: Primary | ICD-10-CM

## 2025-06-30 DIAGNOSIS — E66.3 OVERWEIGHT WITH BODY MASS INDEX (BMI) OF 25 TO 25.9 IN ADULT: ICD-10-CM

## 2025-06-30 PROCEDURE — 3079F DIAST BP 80-89 MM HG: CPT | Performed by: FAMILY MEDICINE

## 2025-06-30 PROCEDURE — 3075F SYST BP GE 130 - 139MM HG: CPT | Performed by: FAMILY MEDICINE

## 2025-06-30 PROCEDURE — 99214 OFFICE O/P EST MOD 30 MIN: CPT | Performed by: FAMILY MEDICINE

## 2025-06-30 PROCEDURE — RXMED WILLOW AMBULATORY MEDICATION CHARGE

## 2025-06-30 PROCEDURE — 1036F TOBACCO NON-USER: CPT | Performed by: FAMILY MEDICINE

## 2025-06-30 RX ORDER — DULOXETIN HYDROCHLORIDE 20 MG/1
40 CAPSULE, DELAYED RELEASE ORAL DAILY
Qty: 180 CAPSULE | Refills: 1 | Status: SHIPPED | OUTPATIENT
Start: 2025-06-30

## 2025-06-30 RX ORDER — NIFEDIPINE 60 MG/1
TABLET, FILM COATED, EXTENDED RELEASE ORAL
Qty: 90 TABLET | Refills: 1 | Status: SHIPPED | OUTPATIENT
Start: 2025-06-30

## 2025-06-30 ASSESSMENT — ENCOUNTER SYMPTOMS: HYPERTENSION: 1

## 2025-06-30 NOTE — ASSESSMENT & PLAN NOTE
Symptoms currently controlled.  Continue with current dose of duloxetine.  Follow-up in 6 months to reevaluate.

## 2025-06-30 NOTE — ASSESSMENT & PLAN NOTE
Blood pressure is controlled today.  Patient is going to continue to monitor this at home.  Check blood work as ordered.  Continue with nifedipine.  Follow-up in 6 months or sooner with any concerns.

## 2025-06-30 NOTE — PROGRESS NOTES
Subjective   Patient ID: Tamera Isaac is a 35 y.o. female who presents for Follow-up and Hypertension.  Patient presents today for follow-up on her chronic medical problems.  Since her last visit she now has a 5-month-old daughter along with her 2-year-old son.  She reports that overall she has been doing well though tired.  She states that she has been checking her blood pressure at home and its typically been running 120s over 70s.  She forgot to take her blood pressure medicine this morning.  She states that her blood pressure did become elevated during her 30/7-week of pregnancy and they delivered her urgently.  She states that she did have some issues with postpartum depression after her last pregnancy.  She had her duloxetine increased to 40 mg and reports that that has been working well.  She would like to continue that and request a refill.  She states that otherwise she has been doing well.  She denies any chest pain or shortness of breath or abdominal pain.  She denies any other concerns.  Hypertension      Social History     Socioeconomic History    Marital status:      Spouse name: Torrey Ziegler    Number of children: Not on file    Years of education: Not on file    Highest education level: Not on file   Occupational History    Not on file   Tobacco Use    Smoking status: Never     Passive exposure: Never    Smokeless tobacco: Never   Vaping Use    Vaping status: Never Used   Substance and Sexual Activity    Alcohol use: Not Currently     Comment: 1 per month    Drug use: Never    Sexual activity: Yes     Partners: Male     Birth control/protection: None   Other Topics Concern    Not on file   Social History Narrative    Not on file     Social Drivers of Health     Financial Resource Strain: Low Risk  (1/10/2025)    Overall Financial Resource Strain (CARDIA)     Difficulty of Paying Living Expenses: Not hard at all   Food Insecurity: Not on file   Transportation Needs: No Transportation  Needs (1/10/2025)    PRAPARE - Transportation     Lack of Transportation (Medical): No     Lack of Transportation (Non-Medical): No   Physical Activity: Not on file   Stress: Not on file   Social Connections: Not on file   Intimate Partner Violence: Not on file     Current Medications[1]  Family History[2]  Review of Systems  Immunization History   Administered Date(s) Administered    DTP 1990, 1990, 1990    DTaP, Unspecified 09/02/1992    Flu vaccine (IIV4), preservative free *Check age/dose* 10/17/2018, 10/03/2019, 10/06/2020, 11/02/2020, 10/01/2021, 09/30/2022, 11/02/2023    Flu vaccine, trivalent, preservative free, age 6 months and greater (Fluarix/Fluzone/Flulaval) 10/03/2024    HPV, Quadrivalent 03/26/2015, 05/28/2015, 09/24/2015    Hepatitis A vaccine, age 19 years and greater (HAVRIX) 06/26/2019    Hepatitis B vaccine, 19 yrs and under (RECOMBIVAX, ENGERIX) 05/27/1994, 06/30/1994, 10/01/1994    HiB, unspecified 1990, 1990, 10/09/1991    Influenza, Unspecified 09/24/2015    Influenza, injectable, quadrivalent 10/01/2017    MMR vaccine, subcutaneous (MMR II) 10/09/1991, 05/23/2002, 07/07/2008    Meningococcal ACWY-D (Menactra) 4-valent conjugate vaccine 07/07/2008    Moderna SARS-CoV-2 Vaccination 03/12/2021, 04/09/2021, 10/28/2021    OPV 1990, 1990, 09/02/1992    PPD Test 12/21/2010, 07/10/2015, 09/28/2015, 10/05/2015, 07/07/2017, 11/29/2021, 12/13/2021    RESPIRATORY SYNCYTIAL VIRUS (RSV), ELIGIBLE PREGNANT PTS, 0.5 ML (ABRYSVO) 12/04/2024    Tdap vaccine, age 7 year and older (BOOSTRIX, ADACEL) 07/07/2008, 12/21/2010, 10/10/2022, 11/08/2024    Varicella vaccine, subcutaneous (VARIVAX) 07/07/2008       Review of Systems negative except as noted in HPI and Chief complaint.     Objective                 /86   Pulse 85   Temp 36.1 °C (97 °F) (Skin)   Wt 64.5 kg (142 lb 3.2 oz)   SpO2 98%   BMI 25.19 kg/m²    Physical Exam  Vitals reviewed.   HENT:      Head:  "Normocephalic and atraumatic.      Right Ear: Tympanic membrane normal.      Left Ear: Tympanic membrane normal.      Nose: Nose normal.      Mouth/Throat:      Pharynx: Oropharynx is clear.   Eyes:      Extraocular Movements: Extraocular movements intact.      Conjunctiva/sclera: Conjunctivae normal.      Pupils: Pupils are equal, round, and reactive to light.   Cardiovascular:      Rate and Rhythm: Normal rate and regular rhythm.      Pulses: Normal pulses.   Pulmonary:      Effort: Pulmonary effort is normal.      Breath sounds: Normal breath sounds.   Abdominal:      General: There is no distension.      Palpations: Abdomen is soft.      Tenderness: There is no abdominal tenderness.   Musculoskeletal:         General: Normal range of motion.      Cervical back: Normal range of motion and neck supple.      Right lower leg: No edema.      Left lower leg: No edema.   Lymphadenopathy:      Cervical: No cervical adenopathy.   Neurological:      General: No focal deficit present.      Mental Status: She is alert.       No results found for this or any previous visit (from the past 96 hours).  Lab Results   Component Value Date    WBC 7.8 01/10/2025    HGB 11.1 (L) 01/10/2025    HCT 32.6 (L) 01/10/2025    MCV 93 01/10/2025     01/10/2025     Lab Results   Component Value Date    GLUCOSE 85 01/10/2025    CALCIUM 8.0 (L) 01/10/2025     (L) 01/10/2025    K 3.7 01/10/2025    CO2 20 (L) 01/10/2025     01/10/2025    BUN 11 01/10/2025    CREATININE 0.60 01/10/2025     Lab Results   Component Value Date    CHOL 174 03/22/2024     Lab Results   Component Value Date    .3 03/22/2024     Lab Results   Component Value Date    LDLCALC 60 03/22/2024     Lab Results   Component Value Date    TRIG 60 03/22/2024     No components found for: \"CHOLHDL\"   Lab Results   Component Value Date    TSH 1.94 03/22/2024      No results found for: \"HGBA1C\"   No results found for: \"ALBUMINUR\"   Assessment/Plan   Problem " List Items Addressed This Visit       Anxiety    Symptoms currently controlled.  Continue with current dose of duloxetine.  Follow-up in 6 months to reevaluate.         Relevant Medications    DULoxetine (Cymbalta) 20 mg DR capsule    RESOLVED: Hypertension - Primary    Blood pressure is controlled today.  Patient is going to continue to monitor this at home.  Check blood work as ordered.  Continue with nifedipine.  Follow-up in 6 months or sooner with any concerns.         Relevant Orders    Albumin-Creatinine Ratio, Urine Random    Lipid Panel    Comprehensive Metabolic Panel    CBC and Auto Differential    Overweight with body mass index (BMI) of 25 to 25.9 in adult     Other Visit Diagnoses         Essential hypertension        Relevant Medications    NIFEdipine ER (Adalat CC) 60 mg 24 hr tablet                      [1]   Current Outpatient Medications   Medication Sig Dispense Refill    acetaminophen (Tylenol Extra Strength) 500 mg tablet Take 2 tablets (1,000 mg) by mouth every 6 hours if needed for mild pain (1 - 3). 30 tablet 0    prenatal no115/iron/folic acid (PRENATAL 19 ORAL) Take 1 tablet by mouth early in the morning..      DULoxetine (Cymbalta) 20 mg DR capsule Take 2 capsules (40 mg) by mouth once daily. 180 capsule 1    NIFEdipine ER (Adalat CC) 60 mg 24 hr tablet Take 1 tablet by mouth every day as directed 90 tablet 1     No current facility-administered medications for this visit.   [2]   Family History  Problem Relation Name Age of Onset    Hypertension Mother Nathalia Isaac     Hypertension Father Cresencio Isaac     Hyperlipidemia Father Cresencio Isaac     Skin cancer Father Cresencio Isaac     Other (head cancer) Father Cresencio Isaac     Other (neck cancer) Father Cresencio Isaac     Other (mouth cancer) Father Cresencio Isaac     Cancer Father Cresencio Isaac

## 2025-07-09 ENCOUNTER — TELEPHONE (OUTPATIENT)
Dept: OBSTETRICS AND GYNECOLOGY | Facility: CLINIC | Age: 35
End: 2025-07-09
Payer: COMMERCIAL

## 2025-07-09 DIAGNOSIS — N61.0 MASTITIS: Primary | ICD-10-CM

## 2025-07-09 PROCEDURE — RXMED WILLOW AMBULATORY MEDICATION CHARGE

## 2025-07-09 RX ORDER — DICLOXACILLIN SODIUM 500 MG/1
500 CAPSULE ORAL 4 TIMES DAILY
Qty: 40 CAPSULE | Refills: 0 | Status: SHIPPED | OUTPATIENT
Start: 2025-07-09 | End: 2025-07-20

## 2025-07-09 NOTE — TELEPHONE ENCOUNTER
Called and spoke with pt letting her know antibiotics were called in. Relayed instructions and advised to call the office back in 24 hours if not improvement 24 hours after 1st dose. Pt verbalized understanding

## 2025-07-10 ENCOUNTER — PHARMACY VISIT (OUTPATIENT)
Dept: PHARMACY | Facility: CLINIC | Age: 35
End: 2025-07-10
Payer: COMMERCIAL

## 2025-08-06 ENCOUNTER — TELEPHONE (OUTPATIENT)
Dept: OBSTETRICS AND GYNECOLOGY | Facility: CLINIC | Age: 35
End: 2025-08-06
Payer: COMMERCIAL

## 2025-08-06 NOTE — TELEPHONE ENCOUNTER
Called patient. Patient stated she is having nipple pain, previous had an abscess and has a blister on her left nipple. Patient denies any breast pain or redness. Patient informed to bring the pump and the baby for the appointment.     Patient verbalized understanding

## 2025-08-14 ENCOUNTER — APPOINTMENT (OUTPATIENT)
Dept: OBSTETRICS AND GYNECOLOGY | Facility: CLINIC | Age: 35
End: 2025-08-14
Payer: COMMERCIAL

## 2025-08-14 VITALS — DIASTOLIC BLOOD PRESSURE: 70 MMHG | SYSTOLIC BLOOD PRESSURE: 134 MMHG | BODY MASS INDEX: 25.19 KG/M2 | WEIGHT: 142.2 LBS

## 2025-08-14 DIAGNOSIS — O92.29: Primary | ICD-10-CM

## 2025-08-14 DIAGNOSIS — S20.129A: Primary | ICD-10-CM

## 2025-08-14 PROCEDURE — 1036F TOBACCO NON-USER: CPT | Performed by: OBSTETRICS & GYNECOLOGY

## 2025-08-14 PROCEDURE — 99214 OFFICE O/P EST MOD 30 MIN: CPT | Performed by: OBSTETRICS & GYNECOLOGY

## 2025-08-14 PROCEDURE — RXMED WILLOW AMBULATORY MEDICATION CHARGE

## 2025-08-14 RX ORDER — TRIAMCINOLONE ACETONIDE 5 MG/G
OINTMENT TOPICAL
Qty: 15 G | Refills: 0 | Status: SHIPPED | OUTPATIENT
Start: 2025-08-14

## 2025-08-14 ASSESSMENT — EDINBURGH POSTNATAL DEPRESSION SCALE (EPDS)
I HAVE FELT SCARED OR PANICKY FOR NO GOOD REASON: NO, NOT AT ALL
THINGS HAVE BEEN GETTING ON TOP OF ME: NO, MOST OF THE TIME I HAVE COPED QUITE WELL
I HAVE BLAMED MYSELF UNNECESSARILY WHEN THINGS WENT WRONG: NO, NEVER
I HAVE BEEN ANXIOUS OR WORRIED FOR NO GOOD REASON: NO, NOT AT ALL
I HAVE BEEN SO UNHAPPY THAT I HAVE HAD DIFFICULTY SLEEPING: NOT AT ALL
I HAVE BEEN SO UNHAPPY THAT I HAVE BEEN CRYING: NO, NEVER
I HAVE LOOKED FORWARD WITH ENJOYMENT TO THINGS: AS MUCH AS I EVER DID
THE THOUGHT OF HARMING MYSELF HAS OCCURRED TO ME: NEVER
TOTAL SCORE: 1
I HAVE FELT SAD OR MISERABLE: NO, NOT AT ALL
I HAVE BEEN ABLE TO LAUGH AND SEE THE FUNNY SIDE OF THINGS: AS MUCH AS I ALWAYS COULD

## 2025-08-14 ASSESSMENT — PATIENT HEALTH QUESTIONNAIRE - PHQ9
2. FEELING DOWN, DEPRESSED OR HOPELESS: NOT AT ALL
SUM OF ALL RESPONSES TO PHQ9 QUESTIONS 1 AND 2: 0
1. LITTLE INTEREST OR PLEASURE IN DOING THINGS: NOT AT ALL

## 2025-08-14 ASSESSMENT — PAIN SCALES - GENERAL: PAINLEVEL_OUTOF10: 0-NO PAIN

## 2025-08-16 ENCOUNTER — PHARMACY VISIT (OUTPATIENT)
Dept: PHARMACY | Facility: CLINIC | Age: 35
End: 2025-08-16
Payer: COMMERCIAL

## 2025-08-25 ENCOUNTER — APPOINTMENT (OUTPATIENT)
Dept: DERMATOLOGY | Facility: CLINIC | Age: 35
End: 2025-08-25
Payer: COMMERCIAL

## 2025-09-25 ENCOUNTER — APPOINTMENT (OUTPATIENT)
Dept: OBSTETRICS AND GYNECOLOGY | Facility: CLINIC | Age: 35
End: 2025-09-25
Payer: COMMERCIAL

## 2025-10-01 ENCOUNTER — APPOINTMENT (OUTPATIENT)
Dept: DERMATOLOGY | Facility: CLINIC | Age: 35
End: 2025-10-01
Payer: COMMERCIAL

## 2026-01-05 ENCOUNTER — APPOINTMENT (OUTPATIENT)
Dept: PRIMARY CARE | Facility: CLINIC | Age: 36
End: 2026-01-05
Payer: COMMERCIAL